# Patient Record
Sex: MALE | Race: WHITE | NOT HISPANIC OR LATINO | Employment: FULL TIME | RURAL
[De-identification: names, ages, dates, MRNs, and addresses within clinical notes are randomized per-mention and may not be internally consistent; named-entity substitution may affect disease eponyms.]

---

## 2020-08-26 ENCOUNTER — HISTORICAL (OUTPATIENT)
Dept: ADMINISTRATIVE | Facility: HOSPITAL | Age: 70
End: 2020-08-26

## 2020-08-27 LAB — SARS-COV+SARS-COV-2 AG RESP QL IA.RAPID: NEGATIVE

## 2022-08-30 ENCOUNTER — OUTSIDE PLACE OF SERVICE (OUTPATIENT)
Dept: CARDIOLOGY | Facility: HOSPITAL | Age: 72
End: 2022-08-30

## 2022-08-30 PROCEDURE — 93010 PR ELECTROCARDIOGRAM REPORT: ICD-10-PCS | Mod: ,,, | Performed by: INTERNAL MEDICINE

## 2022-08-30 PROCEDURE — 93010 ELECTROCARDIOGRAM REPORT: CPT | Mod: ,,, | Performed by: INTERNAL MEDICINE

## 2023-10-19 DIAGNOSIS — S86.111A: Primary | ICD-10-CM

## 2023-11-07 ENCOUNTER — OFFICE VISIT (OUTPATIENT)
Dept: ORTHOPEDICS | Facility: CLINIC | Age: 73
End: 2023-11-07
Payer: OTHER MISCELLANEOUS

## 2023-11-07 VITALS — BODY MASS INDEX: 29.52 KG/M2 | WEIGHT: 230 LBS | HEIGHT: 74 IN

## 2023-11-07 DIAGNOSIS — S86.111A STRAIN OF RIGHT SOLEUS MUSCLE, INITIAL ENCOUNTER: Primary | ICD-10-CM

## 2023-11-07 DIAGNOSIS — S86.111A: ICD-10-CM

## 2023-11-07 PROCEDURE — 99204 OFFICE O/P NEW MOD 45 MIN: CPT | Mod: S$PBB,,, | Performed by: ORTHOPAEDIC SURGERY

## 2023-11-07 PROCEDURE — 99213 OFFICE O/P EST LOW 20 MIN: CPT | Mod: PBBFAC | Performed by: ORTHOPAEDIC SURGERY

## 2023-11-07 PROCEDURE — 99204 PR OFFICE/OUTPT VISIT, NEW, LEVL IV, 45-59 MIN: ICD-10-PCS | Mod: S$PBB,,, | Performed by: ORTHOPAEDIC SURGERY

## 2023-11-07 RX ORDER — NAPROXEN 500 MG/1
500 TABLET ORAL 2 TIMES DAILY WITH MEALS
Qty: 60 TABLET | Refills: 3 | Status: SHIPPED | OUTPATIENT
Start: 2023-11-07

## 2023-11-07 RX ORDER — ATORVASTATIN CALCIUM 80 MG/1
80 TABLET, FILM COATED ORAL
COMMUNITY
Start: 2023-10-07

## 2023-11-07 RX ORDER — TAMSULOSIN HYDROCHLORIDE 0.4 MG/1
2 CAPSULE ORAL
COMMUNITY
Start: 2023-08-08

## 2023-11-07 RX ORDER — METOPROLOL SUCCINATE 100 MG/1
100 TABLET, EXTENDED RELEASE ORAL
COMMUNITY
Start: 2023-10-10

## 2023-11-07 RX ORDER — GLIPIZIDE 10 MG/1
10 TABLET ORAL
COMMUNITY
Start: 2023-10-07

## 2023-11-07 RX ORDER — ZOLPIDEM TARTRATE 10 MG/1
10 TABLET ORAL NIGHTLY PRN
COMMUNITY
Start: 2023-10-17

## 2023-11-07 RX ORDER — AMLODIPINE AND BENAZEPRIL HYDROCHLORIDE 10; 20 MG/1; MG/1
1 CAPSULE ORAL
COMMUNITY
Start: 2023-10-07
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
## 2023-12-07 ENCOUNTER — OFFICE VISIT (OUTPATIENT)
Dept: ORTHOPEDICS | Facility: CLINIC | Age: 73
End: 2023-12-07
Payer: OTHER MISCELLANEOUS

## 2023-12-07 DIAGNOSIS — S86.111A STRAIN OF RIGHT SOLEUS MUSCLE, INITIAL ENCOUNTER: Primary | ICD-10-CM

## 2023-12-07 PROCEDURE — 99214 PR OFFICE/OUTPT VISIT, EST, LEVL IV, 30-39 MIN: ICD-10-PCS | Mod: S$PBB,,, | Performed by: ORTHOPAEDIC SURGERY

## 2023-12-07 PROCEDURE — 99212 OFFICE O/P EST SF 10 MIN: CPT | Mod: PBBFAC | Performed by: ORTHOPAEDIC SURGERY

## 2023-12-07 PROCEDURE — 99214 OFFICE O/P EST MOD 30 MIN: CPT | Mod: S$PBB,,, | Performed by: ORTHOPAEDIC SURGERY

## 2023-12-07 NOTE — PROGRESS NOTES
73 y.o. Male returns to clinic for a follow up visit regarding     ICD-10-CM ICD-9-CM   1. Strain of right soleus muscle, initial encounter  S86.791A 844.8        Patient is doing better. He is going therapy twice a week, wearing his boot when out and about. He still have swelling noted.       Past Medical History:   Diagnosis Date    Diabetes mellitus, type 2     Hypertension      Past Surgical History:   Procedure Laterality Date    CARDIAC SURGERY           PHYSICAL EXAMINATION:    Ortho/SPM Exam  Still demonstrates 2+ pitting edema to the right lower extremity.  Dorsiflexion plantar flexion appears to be improving today when inspecting his tibiotalar joint.  Mild tenderness is present along the gastroc musculature proximally along the course of the soleus.    IMAGING:  No results found.     ASSESSMENT:      ICD-10-CM ICD-9-CM   1. 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  Still not at maximum medical improvement.  Recommend holding her out of work at this time.  Based on his work duties I do not think he is ready for even light duty at this point.  I will see him back in 4 weeks for recheck        There are no Patient Instructions on file for this visit.      No orders of the defined types were placed in this encounter.        Procedures

## 2024-01-11 ENCOUNTER — OFFICE VISIT (OUTPATIENT)
Dept: ORTHOPEDICS | Facility: CLINIC | Age: 74
End: 2024-01-11
Payer: OTHER MISCELLANEOUS

## 2024-01-11 DIAGNOSIS — S86.111D STRAIN OF RIGHT SOLEUS MUSCLE, SUBSEQUENT ENCOUNTER: Primary | ICD-10-CM

## 2024-01-11 PROCEDURE — 99213 OFFICE O/P EST LOW 20 MIN: CPT | Mod: PBBFAC | Performed by: ORTHOPAEDIC SURGERY

## 2024-01-11 PROCEDURE — 99214 OFFICE O/P EST MOD 30 MIN: CPT | Mod: S$PBB,,, | Performed by: ORTHOPAEDIC SURGERY

## 2024-01-11 NOTE — PROGRESS NOTES
73 y.o. Male returns to clinic for a follow up visit regarding     ICD-10-CM ICD-9-CM   1. Strain of right soleus muscle, subsequent encounter  S86.111D V58.89     844.8       Patient reports he is doing better.   He does still have some swelling and discoloration noted.   He has finished PT at this time.   He reports that even with normal driving his foot tires easily.     He has recently seen the cardiac doctor who told him to wear compression socks due to leaking veins.          Past Medical History:   Diagnosis Date    Diabetes mellitus, type 2     Hypertension      Past Surgical History:   Procedure Laterality Date    CARDIAC SURGERY           PHYSICAL EXAMINATION:    Ortho/SPM Exam    Pronounced swelling in the right leg with 2+ pitting edema present in the right lower extremity.  He has satisfactory dorsiflexion plantar flexion of the ankle of the tibiotalar joint.  Moderate tenderness is present within the gastroc.  IMAGING:  No results found.     ASSESSMENT:      ICD-10-CM ICD-9-CM   1. Strain of right soleus muscle, subsequent encounter  S86.111D V58.89     844.8       PLAN:     -Findings and treatment options were discussed with the patient  -All questions answered      Still having persistent swelling in his right lower extremity.  He is tells me that he did have an ultrasound performed the I do not have access to those results or imaging but he was told that he had and I a leaking vein.  This seems to indicate that he has some venous insufficiency her an injury may require vein ablation type procedure.  Due to the pronounced swelling which remains at this time point following his soleus injury I think that the muscle injury is probably a lesser concern at this point and the persistent swelling is the major concern at this point.  Still not yet cleared to return to work he has not yet at maximum medical improvement and this would be pending a resolution in his vascular status.  I will see him back in  1 month    There are no Patient Instructions on file for this visit.      No orders of the defined types were placed in this encounter.        Procedures

## 2024-01-18 ENCOUNTER — TELEPHONE (OUTPATIENT)
Dept: ORTHOPEDICS | Facility: CLINIC | Age: 74
End: 2024-01-18
Payer: MEDICARE

## 2024-01-18 NOTE — TELEPHONE ENCOUNTER
SPOKE TO ABDIEL. SHE WAS WANTING TO MAKE SURE WE GOT THE ULTRASOUND RESULTS AND TO SEE IF DR. HAWKINS HAS ANY FURTHER RECOMMENDATIONS. I TOLD HER IF THERE IS ANYTHING FURTHER AFTER DR. HAWKINS REVIEW THE RESULTS WE WILL GIVE HER A CALL.     ----- Message from Calli Thomas sent at 1/18/2024 12:38 PM CST -----  Regarding: RESULTS  ABDIEL WITH [employer] CALLING ABOUT RESULTS OF THE PATIENT VENOUS ULTRASOUND, CALL BACK NUMBER [phone]

## 2024-01-24 DIAGNOSIS — S86.111S: Primary | ICD-10-CM

## 2024-02-06 ENCOUNTER — OFFICE VISIT (OUTPATIENT)
Dept: VASCULAR SURGERY | Facility: CLINIC | Age: 74
End: 2024-02-06
Payer: MEDICARE

## 2024-02-06 ENCOUNTER — HOSPITAL ENCOUNTER (OUTPATIENT)
Dept: RADIOLOGY | Facility: HOSPITAL | Age: 74
Discharge: HOME | End: 2024-02-06
Attending: FAMILY MEDICINE
Payer: MEDICARE

## 2024-02-06 VITALS
SYSTOLIC BLOOD PRESSURE: 132 MMHG | HEIGHT: 72 IN | BODY MASS INDEX: 31.15 KG/M2 | WEIGHT: 230 LBS | HEART RATE: 76 BPM | DIASTOLIC BLOOD PRESSURE: 84 MMHG | RESPIRATION RATE: 16 BRPM

## 2024-02-06 DIAGNOSIS — M79.604 LEG PAIN, BILATERAL: ICD-10-CM

## 2024-02-06 DIAGNOSIS — I87.2 VENOUS INSUFFICIENCY: Primary | ICD-10-CM

## 2024-02-06 DIAGNOSIS — R60.0 LOWER EXTREMITY EDEMA: ICD-10-CM

## 2024-02-06 DIAGNOSIS — M79.661 PAIN AND SWELLING OF RIGHT LOWER LEG: ICD-10-CM

## 2024-02-06 DIAGNOSIS — M79.605 LEG PAIN, BILATERAL: ICD-10-CM

## 2024-02-06 DIAGNOSIS — M79.89 PAIN AND SWELLING OF RIGHT LOWER LEG: ICD-10-CM

## 2024-02-06 DIAGNOSIS — L81.9 HYPERPIGMENTATION OF SKIN: ICD-10-CM

## 2024-02-06 PROCEDURE — 1159F MED LIST DOCD IN RCRD: CPT | Mod: CPTII,,, | Performed by: FAMILY MEDICINE

## 2024-02-06 PROCEDURE — 4010F ACE/ARB THERAPY RXD/TAKEN: CPT | Mod: CPTII,,, | Performed by: FAMILY MEDICINE

## 2024-02-06 PROCEDURE — 3008F BODY MASS INDEX DOCD: CPT | Mod: CPTII,,, | Performed by: FAMILY MEDICINE

## 2024-02-06 PROCEDURE — 99215 OFFICE O/P EST HI 40 MIN: CPT | Mod: PBBFAC,25 | Performed by: FAMILY MEDICINE

## 2024-02-06 PROCEDURE — 3079F DIAST BP 80-89 MM HG: CPT | Mod: CPTII,,, | Performed by: FAMILY MEDICINE

## 2024-02-06 PROCEDURE — 93970 EXTREMITY STUDY: CPT | Mod: TC

## 2024-02-06 PROCEDURE — 3075F SYST BP GE 130 - 139MM HG: CPT | Mod: CPTII,,, | Performed by: FAMILY MEDICINE

## 2024-02-06 PROCEDURE — 1160F RVW MEDS BY RX/DR IN RCRD: CPT | Mod: CPTII,,, | Performed by: FAMILY MEDICINE

## 2024-02-06 PROCEDURE — 99204 OFFICE O/P NEW MOD 45 MIN: CPT | Mod: S$PBB,,, | Performed by: FAMILY MEDICINE

## 2024-02-06 PROCEDURE — 93970 EXTREMITY STUDY: CPT | Mod: 26,,, | Performed by: FAMILY MEDICINE

## 2024-02-06 RX ORDER — DAPAGLIFLOZIN 10 MG/1
10 TABLET, FILM COATED ORAL
COMMUNITY

## 2024-02-06 RX ORDER — ALBUTEROL SULFATE 90 UG/1
AEROSOL, METERED RESPIRATORY (INHALATION)
COMMUNITY

## 2024-02-06 RX ORDER — METHOCARBAMOL 500 MG/1
TABLET, FILM COATED ORAL
COMMUNITY
Start: 2023-09-25

## 2024-02-06 RX ORDER — NITROGLYCERIN 0.4 MG/1
TABLET SUBLINGUAL
COMMUNITY

## 2024-02-06 RX ORDER — VITAMIN E 268 MG
1 CAPSULE ORAL
COMMUNITY

## 2024-02-06 RX ORDER — METFORMIN HYDROCHLORIDE 1000 MG/1
TABLET ORAL
Status: ON HOLD | COMMUNITY
End: 2024-04-22 | Stop reason: HOSPADM

## 2024-02-06 RX ORDER — CLOTRIMAZOLE 1 %
CREAM (GRAM) TOPICAL
COMMUNITY
Start: 2024-01-23

## 2024-02-06 RX ORDER — SILDENAFIL 100 MG/1
TABLET, FILM COATED ORAL
COMMUNITY
Start: 2024-02-02
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
## 2024-02-08 ENCOUNTER — HOSPITAL ENCOUNTER (OUTPATIENT)
Dept: RADIOLOGY | Facility: HOSPITAL | Age: 74
Discharge: HOME | End: 2024-02-08
Attending: ORTHOPAEDIC SURGERY
Payer: MEDICARE

## 2024-02-08 ENCOUNTER — OFFICE VISIT (OUTPATIENT)
Dept: ORTHOPEDICS | Facility: CLINIC | Age: 74
End: 2024-02-08
Payer: OTHER MISCELLANEOUS

## 2024-02-08 DIAGNOSIS — M25.511 RIGHT SHOULDER PAIN, UNSPECIFIED CHRONICITY: ICD-10-CM

## 2024-02-08 DIAGNOSIS — M25.511 RIGHT SHOULDER PAIN, UNSPECIFIED CHRONICITY: Primary | ICD-10-CM

## 2024-02-08 DIAGNOSIS — S86.111S: ICD-10-CM

## 2024-02-08 PROCEDURE — 99214 OFFICE O/P EST MOD 30 MIN: CPT | Mod: S$PBB,,, | Performed by: ORTHOPAEDIC SURGERY

## 2024-02-08 PROCEDURE — 73030 X-RAY EXAM OF SHOULDER: CPT | Mod: TC,RT

## 2024-02-08 PROCEDURE — 73030 X-RAY EXAM OF SHOULDER: CPT | Mod: 26,RT,, | Performed by: ORTHOPAEDIC SURGERY

## 2024-02-08 PROCEDURE — 99212 OFFICE O/P EST SF 10 MIN: CPT | Mod: PBBFAC,25 | Performed by: ORTHOPAEDIC SURGERY
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
## 2024-02-22 ENCOUNTER — TELEPHONE (OUTPATIENT)
Dept: ORTHOPEDICS | Facility: CLINIC | Age: 74
End: 2024-02-22
Payer: MEDICARE

## 2024-02-27 ENCOUNTER — OFFICE VISIT (OUTPATIENT)
Dept: ORTHOPEDICS | Facility: CLINIC | Age: 74
End: 2024-02-27
Payer: OTHER MISCELLANEOUS

## 2024-02-27 DIAGNOSIS — S46.811A PARTIAL TEAR OF RIGHT SUBSCAPULARIS TENDON, INITIAL ENCOUNTER: ICD-10-CM

## 2024-02-27 DIAGNOSIS — M25.511 RIGHT SHOULDER PAIN, UNSPECIFIED CHRONICITY: ICD-10-CM

## 2024-02-27 DIAGNOSIS — M75.100 TEAR OF ROTATOR CUFF, UNSPECIFIED LATERALITY, UNSPECIFIED TEAR EXTENT, UNSPECIFIED WHETHER TRAUMATIC: Primary | ICD-10-CM

## 2024-02-27 PROCEDURE — 20610 DRAIN/INJ JOINT/BURSA W/O US: CPT | Mod: PBBFAC | Performed by: ORTHOPAEDIC SURGERY

## 2024-02-27 PROCEDURE — 99213 OFFICE O/P EST LOW 20 MIN: CPT | Mod: PBBFAC | Performed by: ORTHOPAEDIC SURGERY

## 2024-02-27 PROCEDURE — 99214 OFFICE O/P EST MOD 30 MIN: CPT | Mod: S$PBB,25,, | Performed by: ORTHOPAEDIC SURGERY

## 2024-02-27 PROCEDURE — 99999PBSHW PR PBB SHADOW TECHNICAL ONLY FILED TO HB: Mod: PBBFAC,,,

## 2024-02-27 RX ORDER — TRIAMCINOLONE ACETONIDE 40 MG/ML
80 INJECTION, SUSPENSION INTRA-ARTICULAR; INTRAMUSCULAR
Status: DISCONTINUED | OUTPATIENT
Start: 2024-02-27 | End: 2024-02-27 | Stop reason: HOSPADM

## 2024-02-27 RX ADMIN — TRIAMCINOLONE ACETONIDE 80 MG: 400 INJECTION, SUSPENSION INTRA-ARTICULAR; INTRAMUSCULAR at 10:02

## 2024-02-27 NOTE — LETTER
February 27, 2024      [facility] - Orthopedics  [facility] [address] [facility] [address]  Phone: [phone]  Fax: [phone]       Patient: Jose Gandhi   YOB: 1950  Date of Visit: 02/27/2024    To Whom It May Concern:    Bret Gandhi  was at [facility] on 02/27/2024. The patient will remian off work until follow-up visit in 4 weeks . If you have any questions or concerns, or if I can be of further assistance, please do not hesitate to contact me.    Sincerely,    Dr. Obinna Melissa

## 2024-02-27 NOTE — PROGRESS NOTES
Jose Gandhi returns to clinic for a follow up visit regarding     ICD-10-CM ICD-9-CM   1. Tear of rotator cuff, unspecified laterality, unspecified tear extent, unspecified whether traumatic  M75.100 840.4   2. Right shoulder pain, unspecified chronicity  M25.511 719.41   3. Partial tear of right subscapularis tendon, initial encounter  S46.811A 840.5       Patient is here to review the results of his MRI and discuss treatment options moving forward.       PAST MEDICAL HISTORY:   Past Medical History:   Diagnosis Date    Diabetes mellitus, type 2     Hypertension      PAST SURGICAL HISTORY:   Past Surgical History:   Procedure Laterality Date    CARDIAC SURGERY           PHYSICAL EXAMINATION:  General    Nursing note and vitals reviewed.  Constitutional: He is oriented to person, place, and time. He appears well-developed and well-nourished. No distress.   HENT:   Head: Normocephalic and atraumatic.   Eyes: Pupils are equal, round, and reactive to light.   Neck: Neck supple.   Cardiovascular:  Normal rate and intact distal pulses.            Pulmonary/Chest: Breath sounds normal. No respiratory distress. He exhibits no tenderness.   Abdominal: Soft. Bowel sounds are normal.   Neurological: He is alert and oriented to person, place, and time. He has normal reflexes. He displays normal reflexes. No cranial nerve deficit. He exhibits normal muscle tone.   Psychiatric: He has a normal mood and affect. His behavior is normal. Judgment and thought content normal.         Right Shoulder Exam     Inspection/Observation   Swelling: absent  Bruising: absent  Scapular Dyskinesia: positive    Tenderness   The patient is tender to palpation of the greater tuberosity, acromion and acromioclavicular joint.    Range of Motion   Active abduction:  abnormal   Passive abduction:  abnormal   Forward Flexion:  abnormal   Forward Elevation: abnormal  External Rotation 90 degrees: normal    Tests & Signs   Apprehension:  negative  Cross arm: positive  Drop arm: negative  Swanson test: positive  Impingement: positive  Rotator Cuff Painful Arc/Range: mild  Lag Sign 90 degrees: negative  Lift Off Sign: positive  Belly Press: positive  Active Compression Test (G[place] Ferris's Sign): positive  Jerk Test: negative    Other   Sensation: normal    Comments:  Pain with dynamic labral shear test.  There is pain and weakness with Whipple testing.     Left Shoulder Exam   Left shoulder exam is normal.       Muscle Strength   Right Upper Extremity   Shoulder External Rotation: 4/5   Supraspinatus: 4/5   Subscapularis: 4/5   Biceps: 4/5     Reflexes     Right Side   Biceps:  2+  Right Manrique's Sign:  absent    Vascular Exam     Right Pulses      Radial:                    2+      Capillary Refill  Right Hand: normal capillary refill            IMAGING:  MRI Shoulder Without Contrast Right    Result Date: 2/20/2024  EXAMINATION: MRI SHOULDER WITHOUT CONTRAST RIGHT CLINICAL HISTORY: right shoulder pain;  Pain in right shoulder TECHNIQUE: Multiplanar multislice images are were performed through the right shoulder. COMPARISON: Radiograph right shoulder 02/08/2024 FINDINGS: There is tendinosis of the supraspinatus tendon with low-grade articular surface tearing seen of the anterior more fibers and some interstitial tearing as well.  There is also mild tendinosis of the infraspinatus tendon without tear.  The teres minor tendon is intact.  There is a high-grade near full-thickness articular sided tear of the insertion of the subscapularis tendon with marked tendinosis. There is mild edema seen in the subscapularis musculature.  The overall muscle bulk appears normal.  Remainder of the muscle signal is normal. Long head of the biceps tendon appears normal in signal and location within the bicipital groove. Diffuse degeneration seen along the superior aspect of the labrum as well as posteriorly with arthritic change.  There is moderate osteoarthritic change  with marrow edema of the acromioclavicular joint with inferiorly directed osteophytes similar to that seen on the recent radiograph.  Trace fluid in the subacromial subdeltoid bursa.     1. Near full-thickness tear of the subscapularis tendon with tendinosis. 2. Low-grade articular surface tearing and interstitial tearing of the supraspinatus tendon at the footprint. 3. Mild edema of the subscapularis musculature. 4. Glenohumeral osteoarthritic change and degeneration of the labrum. 5. Moderate acromioclavicular joint osteoarthritis with marrow edema and inferiorly directed osteophytes. 6. Trace fluid in the subacromial subdeltoid bursa. Electronically signed by: Misael Johnson Date:    02/20/2024 Time:    19:50        ASSESSMENT:      ICD-10-CM ICD-9-CM   1. Tear of rotator cuff, unspecified laterality, unspecified tear extent, unspecified whether traumatic  M75.100 840.4   2. Right shoulder pain, unspecified chronicity  M25.511 719.41   3. Partial tear of right subscapularis tendon, initial encounter  S46.811A 840.5       PLAN:     -Findings and treatment options were discussed with the patient  -All questions answered  Natural history and expected course discussed. Questions answered.  Educational material distributed.  Reduction in offending activity.  Shoulder injection. See procedure note.  Physical therapy referral.    We discussed the potential for surgical intervention given his high-grade subscapularis tear but he has a smoker I have stressed the importance of smoking cessation so we are going to treat this conservatively with a steroid injection followed by physical therapy hold off of work at this time.  Not yet ready to return to work will see back in 4 weeks    There are no Patient Instructions on file for this visit.    Orders Placed This Encounter   Procedures    Large Joint Aspiration/Injection: R subacromial bursa       Procedures

## 2024-02-27 NOTE — PROCEDURES
Large Joint Aspiration/Injection: R subacromial bursa    Date/Time: 2/27/2024 10:00 AM    Performed by: Obinna Melissa MD  Authorized by: Obinna Melissa MD    Consent Done?:  Yes (Verbal)  Indications:  Pain  Site marked: the procedure site was marked    Local anesthetic:  Bupivacaine 0.25% without epinephrine (4 cc's of 0.25% bupivicaine)    Details:  Needle Size:  22 G  Approach:  Posterior  Location:  Shoulder  Site:  R subacromial bursa  Medications:  80 mg triamcinolone acetonide 40 mg/mL  Patient tolerance:  Patient tolerated the procedure well with no immediate complications

## 2024-03-26 ENCOUNTER — OFFICE VISIT (OUTPATIENT)
Dept: ORTHOPEDICS | Facility: CLINIC | Age: 74
End: 2024-03-26
Payer: OTHER MISCELLANEOUS

## 2024-03-26 DIAGNOSIS — M19.019 ACROMIOCLAVICULAR JOINT ARTHRITIS, UNSPECIFIED LATERALITY: ICD-10-CM

## 2024-03-26 DIAGNOSIS — Z01.818 PREPROCEDURAL EXAMINATION: Primary | ICD-10-CM

## 2024-03-26 DIAGNOSIS — M75.40 IMPINGEMENT SYNDROME OF SHOULDER REGION, UNSPECIFIED LATERALITY: ICD-10-CM

## 2024-03-26 DIAGNOSIS — M75.100 TEAR OF ROTATOR CUFF, UNSPECIFIED LATERALITY, UNSPECIFIED TEAR EXTENT, UNSPECIFIED WHETHER TRAUMATIC: Primary | ICD-10-CM

## 2024-03-26 DIAGNOSIS — M75.22 BICEPS TENDONITIS ON LEFT: ICD-10-CM

## 2024-03-26 PROCEDURE — 99214 OFFICE O/P EST MOD 30 MIN: CPT | Mod: S$PBB,,, | Performed by: ORTHOPAEDIC SURGERY

## 2024-03-26 PROCEDURE — 99212 OFFICE O/P EST SF 10 MIN: CPT | Mod: PBBFAC | Performed by: ORTHOPAEDIC SURGERY

## 2024-03-26 NOTE — H&P (VIEW-ONLY)
73 y.o. Male returns to clinic for a follow up visit regarding     ICD-10-CM ICD-9-CM   1. Tear of rotator cuff, unspecified laterality, unspecified tear extent, unspecified whether traumatic  M75.100 840.4   2. Impingement syndrome of shoulder region, unspecified laterality  M75.40 726.2   3. Acromioclavicular joint arthritis, unspecified laterality  M19.019 716.91   4. Biceps tendonitis on left  M75.22 726.12        States from his therapy visit yesterday,  he has some improvement but no much.  SANE is a 45.  Prior to the injection, it was a 40.  Still in tremendous pain. Has limited strength  Has quit smoking and is doing well there.      Past Medical History:   Diagnosis Date    Diabetes mellitus, type 2     Hypertension      Past Surgical History:   Procedure Laterality Date    CARDIAC SURGERY           PHYSICAL EXAMINATION:    General    Nursing note and vitals reviewed.  Constitutional: He is oriented to person, place, and time. He appears well-developed and well-nourished. No distress.   HENT:   Head: Normocephalic and atraumatic.   Eyes: Pupils are equal, round, and reactive to light.   Neck: Neck supple.   Cardiovascular:  Normal rate and intact distal pulses.            Pulmonary/Chest: Breath sounds normal. No respiratory distress. He exhibits no tenderness.   Abdominal: Soft. Bowel sounds are normal.   Neurological: He is alert and oriented to person, place, and time. He has normal reflexes. He displays normal reflexes. No cranial nerve deficit. He exhibits normal muscle tone.   Psychiatric: He has a normal mood and affect. His behavior is normal. Judgment and thought content normal.         Right Shoulder Exam     Inspection/Observation   Swelling: absent  Bruising: absent  Scapular Dyskinesia: positive    Tenderness   The patient is tender to palpation of the greater tuberosity, acromion and acromioclavicular joint.    Range of Motion   Active abduction:  abnormal   Passive abduction:  abnormal    Forward Flexion:  abnormal   Forward Elevation: abnormal  External Rotation 90 degrees: normal    Tests & Signs   Apprehension: negative  Cross arm: positive  Drop arm: negative  Swanson test: positive  Impingement: positive  Rotator Cuff Painful Arc/Range: mild  Lag Sign 90 degrees: negative  Lift Off Sign: positive  Belly Press: positive  Active Compression Test (Rooks's Sign): positive  Jerk Test: negative    Other   Sensation: normal    Comments:  Pain with dynamic labral shear test.  There is pain and weakness with Whipple testing.     Left Shoulder Exam   Left shoulder exam is normal.       Muscle Strength   Right Upper Extremity   Shoulder External Rotation: 4/5   Supraspinatus: 4/5   Subscapularis: 4/5   Biceps: 4/5     Reflexes     Right Side   Biceps:  2+  Right Manrique's Sign:  absent    Vascular Exam     Right Pulses      Radial:                    2+      Capillary Refill  Right Hand: normal capillary refill            IMAGING:  No results found.   Narrative & Impression  EXAMINATION:  MRI SHOULDER WITHOUT CONTRAST RIGHT     CLINICAL HISTORY:  right shoulder pain;  Pain in right shoulder     TECHNIQUE:  Multiplanar multislice images are were performed through the right shoulder.     COMPARISON:  Radiograph right shoulder 02/08/2024     FINDINGS:  There is tendinosis of the supraspinatus tendon with low-grade articular surface tearing seen of the anterior more fibers and some interstitial tearing as well.  There is also mild tendinosis of the infraspinatus tendon without tear.  The teres minor tendon is intact.  There is a high-grade near full-thickness articular sided tear of the insertion of the subscapularis tendon with marked tendinosis.     There is mild edema seen in the subscapularis musculature.  The overall muscle bulk appears normal.  Remainder of the muscle signal is normal.     Long head of the biceps tendon appears normal in signal and location within the bicipital groove.     Diffuse  degeneration seen along the superior aspect of the labrum as well as posteriorly with arthritic change.  There is moderate osteoarthritic change with marrow edema of the acromioclavicular joint with inferiorly directed osteophytes similar to that seen on the recent radiograph.  Trace fluid in the subacromial subdeltoid bursa.     Impression:     1. Near full-thickness tear of the subscapularis tendon with tendinosis.  2. Low-grade articular surface tearing and interstitial tearing of the supraspinatus tendon at the footprint.  3. Mild edema of the subscapularis musculature.  4. Glenohumeral osteoarthritic change and degeneration of the labrum.  5. Moderate acromioclavicular joint osteoarthritis with marrow edema and inferiorly directed osteophytes.  6. Trace fluid in the subacromial subdeltoid bursa.     ASSESSMENT:      ICD-10-CM ICD-9-CM   1. Tear of rotator cuff, unspecified laterality, unspecified tear extent, unspecified whether traumatic  M75.100 840.4   2. Impingement syndrome of shoulder region, unspecified laterality  M75.40 726.2   3. Acromioclavicular joint arthritis, unspecified laterality  M19.019 716.91   4. Biceps tendonitis on left  M75.22 726.12       PLAN:     -Findings and treatment options were discussed with the patient  -All questions answered      MRI findings were reviewed with the patient.  The patient has a symptomatic rt shoulder partial -thickness rotator cuff tear with associated impingement findings.  Muscle quality is well maintained.  Treatment options were reviewed to include both operative and nonoperative measures.  The patient has failed an initial course of conservative treatment.  Given the extent and duration of the patient's complaints, operative intervention is certainly reasonable at this point.  The details of arthroscopic rotator cuff repair with subacromial decompression, biceps tenotomy versus tenodesis, labral debridement, distal clavicle excision, were  discussed.    The patient understands the likely convalescence after surgery, in particular the expected postop rehab and recovery course. Alternatives both operative and non-operative with associated risks and benefits discussed. The outlined risks and potential complications of the proposed procedure include but are not limited to: infection, poor wound healing, scarring, stiffness, swelling, continued or recurrent pain, instability, hardware or prosthetic failure, symptomatic hardware requiring removal, weakness, neurovascular injury, numbness, chronic regional pain disorder, tissue nonhealing/irreparability/retear, contralateral ligament injury, chondral injury, arthritis, fracture, blood clot formation, inability to return to previous level of activity, anesthetic or regional block complication up to death, need for additional procedure as indicated, and potential need for further surgery.     he was also informed and understands the risks of surgery are greater for patients with a current condition or history of heart disease, obesity, clotting disorders, recurrent infections, steroid use, current or past smoking, and factors such as sedentary lifestyle and noncompliance with medications, therapy or follow-up. The degree of the increased risk is hard to estimate with any degree of precision.    All questions were answered. The patient has verbalized understanding of these issues and wishes to proceed as discussed. The patient understands that recovery time can be up to 6-12 months.      There are no Patient Instructions on file for this visit.      No orders of the defined types were placed in this encounter.        Procedures

## 2024-03-26 NOTE — PROGRESS NOTES
73 y.o. Male returns to clinic for a follow up visit regarding     ICD-10-CM ICD-9-CM   1. Tear of rotator cuff, unspecified laterality, unspecified tear extent, unspecified whether traumatic  M75.100 840.4   2. Impingement syndrome of shoulder region, unspecified laterality  M75.40 726.2   3. Acromioclavicular joint arthritis, unspecified laterality  M19.019 716.91   4. Biceps tendonitis on left  M75.22 726.12        States from his therapy visit yesterday,  he has some improvement but no much.  SANE is a 45.  Prior to the injection, it was a 40.  Still in tremendous pain. Has limited strength  Has quit smoking and is doing well there.      Past Medical History:   Diagnosis Date    Diabetes mellitus, type 2     Hypertension      Past Surgical History:   Procedure Laterality Date    CARDIAC SURGERY           PHYSICAL EXAMINATION:    General    Nursing note and vitals reviewed.  Constitutional: He is oriented to person, place, and time. He appears well-developed and well-nourished. No distress.   HENT:   Head: Normocephalic and atraumatic.   Eyes: Pupils are equal, round, and reactive to light.   Neck: Neck supple.   Cardiovascular:  Normal rate and intact distal pulses.            Pulmonary/Chest: Breath sounds normal. No respiratory distress. He exhibits no tenderness.   Abdominal: Soft. Bowel sounds are normal.   Neurological: He is alert and oriented to person, place, and time. He has normal reflexes. He displays normal reflexes. No cranial nerve deficit. He exhibits normal muscle tone.   Psychiatric: He has a normal mood and affect. His behavior is normal. Judgment and thought content normal.         Right Shoulder Exam     Inspection/Observation   Swelling: absent  Bruising: absent  Scapular Dyskinesia: positive    Tenderness   The patient is tender to palpation of the greater tuberosity, acromion and acromioclavicular joint.    Range of Motion   Active abduction:  abnormal   Passive abduction:  abnormal    Forward Flexion:  abnormal   Forward Elevation: abnormal  External Rotation 90 degrees: normal    Tests & Signs   Apprehension: negative  Cross arm: positive  Drop arm: negative  Swanson test: positive  Impingement: positive  Rotator Cuff Painful Arc/Range: mild  Lag Sign 90 degrees: negative  Lift Off Sign: positive  Belly Press: positive  Active Compression Test (Hidalgo's Sign): positive  Jerk Test: negative    Other   Sensation: normal    Comments:  Pain with dynamic labral shear test.  There is pain and weakness with Whipple testing.     Left Shoulder Exam   Left shoulder exam is normal.       Muscle Strength   Right Upper Extremity   Shoulder External Rotation: 4/5   Supraspinatus: 4/5   Subscapularis: 4/5   Biceps: 4/5     Reflexes     Right Side   Biceps:  2+  Right Manrique's Sign:  absent    Vascular Exam     Right Pulses      Radial:                    2+      Capillary Refill  Right Hand: normal capillary refill            IMAGING:  No results found.   Narrative & Impression  EXAMINATION:  MRI SHOULDER WITHOUT CONTRAST RIGHT     CLINICAL HISTORY:  right shoulder pain;  Pain in right shoulder     TECHNIQUE:  Multiplanar multislice images are were performed through the right shoulder.     COMPARISON:  Radiograph right shoulder 02/08/2024     FINDINGS:  There is tendinosis of the supraspinatus tendon with low-grade articular surface tearing seen of the anterior more fibers and some interstitial tearing as well.  There is also mild tendinosis of the infraspinatus tendon without tear.  The teres minor tendon is intact.  There is a high-grade near full-thickness articular sided tear of the insertion of the subscapularis tendon with marked tendinosis.     There is mild edema seen in the subscapularis musculature.  The overall muscle bulk appears normal.  Remainder of the muscle signal is normal.     Long head of the biceps tendon appears normal in signal and location within the bicipital groove.     Diffuse  degeneration seen along the superior aspect of the labrum as well as posteriorly with arthritic change.  There is moderate osteoarthritic change with marrow edema of the acromioclavicular joint with inferiorly directed osteophytes similar to that seen on the recent radiograph.  Trace fluid in the subacromial subdeltoid bursa.     Impression:     1. Near full-thickness tear of the subscapularis tendon with tendinosis.  2. Low-grade articular surface tearing and interstitial tearing of the supraspinatus tendon at the footprint.  3. Mild edema of the subscapularis musculature.  4. Glenohumeral osteoarthritic change and degeneration of the labrum.  5. Moderate acromioclavicular joint osteoarthritis with marrow edema and inferiorly directed osteophytes.  6. Trace fluid in the subacromial subdeltoid bursa.     ASSESSMENT:      ICD-10-CM ICD-9-CM   1. Tear of rotator cuff, unspecified laterality, unspecified tear extent, unspecified whether traumatic  M75.100 840.4   2. Impingement syndrome of shoulder region, unspecified laterality  M75.40 726.2   3. Acromioclavicular joint arthritis, unspecified laterality  M19.019 716.91   4. Biceps tendonitis on left  M75.22 726.12       PLAN:     -Findings and treatment options were discussed with the patient  -All questions answered      MRI findings were reviewed with the patient.  The patient has a symptomatic rt shoulder partial -thickness rotator cuff tear with associated impingement findings.  Muscle quality is well maintained.  Treatment options were reviewed to include both operative and nonoperative measures.  The patient has failed an initial course of conservative treatment.  Given the extent and duration of the patient's complaints, operative intervention is certainly reasonable at this point.  The details of arthroscopic rotator cuff repair with subacromial decompression, biceps tenotomy versus tenodesis, labral debridement, distal clavicle excision, were  discussed.    The patient understands the likely convalescence after surgery, in particular the expected postop rehab and recovery course. Alternatives both operative and non-operative with associated risks and benefits discussed. The outlined risks and potential complications of the proposed procedure include but are not limited to: infection, poor wound healing, scarring, stiffness, swelling, continued or recurrent pain, instability, hardware or prosthetic failure, symptomatic hardware requiring removal, weakness, neurovascular injury, numbness, chronic regional pain disorder, tissue nonhealing/irreparability/retear, contralateral ligament injury, chondral injury, arthritis, fracture, blood clot formation, inability to return to previous level of activity, anesthetic or regional block complication up to death, need for additional procedure as indicated, and potential need for further surgery.     he was also informed and understands the risks of surgery are greater for patients with a current condition or history of heart disease, obesity, clotting disorders, recurrent infections, steroid use, current or past smoking, and factors such as sedentary lifestyle and noncompliance with medications, therapy or follow-up. The degree of the increased risk is hard to estimate with any degree of precision.    All questions were answered. The patient has verbalized understanding of these issues and wishes to proceed as discussed. The patient understands that recovery time can be up to 6-12 months.      There are no Patient Instructions on file for this visit.      No orders of the defined types were placed in this encounter.        Procedures

## 2024-03-27 DIAGNOSIS — M75.100 TEAR OF ROTATOR CUFF, UNSPECIFIED LATERALITY, UNSPECIFIED TEAR EXTENT, UNSPECIFIED WHETHER TRAUMATIC: Primary | ICD-10-CM

## 2024-03-27 DIAGNOSIS — Z01.818 PREPROCEDURAL EXAMINATION: Primary | ICD-10-CM

## 2024-03-27 RX ORDER — CEFAZOLIN SODIUM 2 G/50ML
2 SOLUTION INTRAVENOUS
Status: CANCELLED | OUTPATIENT
Start: 2024-03-27

## 2024-03-27 RX ORDER — SODIUM CHLORIDE 9 MG/ML
INJECTION, SOLUTION INTRAVENOUS CONTINUOUS
Status: CANCELLED | OUTPATIENT
Start: 2024-03-27

## 2024-03-27 RX ORDER — MUPIROCIN 20 MG/G
OINTMENT TOPICAL
Status: CANCELLED | OUTPATIENT
Start: 2024-03-27

## 2024-04-15 ENCOUNTER — TELEPHONE (OUTPATIENT)
Dept: ORTHOPEDICS | Facility: CLINIC | Age: 74
End: 2024-04-15
Payer: MEDICARE

## 2024-04-15 NOTE — TELEPHONE ENCOUNTER
----- Message from Oanh Cheney sent at 4/15/2024  8:53 AM CDT -----  Regarding: QUESTION  PATIENT SAYS HE RECEIVED A TEXT MESSAGE SAYING HE HAS SOME SPECIAL TASKS HE HAS TO DO BEFORE SURGERY. CALL BACK NUMBER IS [phone].

## 2024-04-18 ENCOUNTER — TELEPHONE (OUTPATIENT)
Dept: ORTHOPEDICS | Facility: CLINIC | Age: 74
End: 2024-04-18
Payer: MEDICARE

## 2024-04-18 NOTE — TELEPHONE ENCOUNTER
----- Message from Calli Thomas sent at 4/18/2024 11:35 AM CDT -----  Regarding: RETURN CALL  MATT WITH [employer] CALLING STATING SHE IS MISSING THE CLAIM NUMBER AND THE PAYER, CALL BACK NUMBER [phone]

## 2024-04-19 ENCOUNTER — TELEPHONE (OUTPATIENT)
Dept: ORTHOPEDICS | Facility: CLINIC | Age: 74
End: 2024-04-19
Payer: MEDICARE

## 2024-04-19 RX ORDER — ASPIRIN 81 MG/1
81 TABLET ORAL DAILY
COMMUNITY

## 2024-04-19 NOTE — TELEPHONE ENCOUNTER
----- Message from Joellen Benedict sent at 4/19/2024 11:41 AM CDT -----    Returning a call to Cynthia. Call back # [phone]

## 2024-04-22 ENCOUNTER — ANESTHESIA EVENT (OUTPATIENT)
Dept: SURGERY | Facility: HOSPITAL | Age: 74
End: 2024-04-22
Payer: OTHER MISCELLANEOUS

## 2024-04-22 ENCOUNTER — ANESTHESIA (OUTPATIENT)
Dept: SURGERY | Facility: HOSPITAL | Age: 74
End: 2024-04-22
Payer: OTHER MISCELLANEOUS

## 2024-04-22 ENCOUNTER — HOSPITAL ENCOUNTER (OUTPATIENT)
Facility: HOSPITAL | Age: 74
Discharge: HOME | End: 2024-04-22
Attending: ORTHOPAEDIC SURGERY
Payer: OTHER MISCELLANEOUS

## 2024-04-22 VITALS
SYSTOLIC BLOOD PRESSURE: 190 MMHG | DIASTOLIC BLOOD PRESSURE: 94 MMHG | HEIGHT: 74 IN | HEART RATE: 72 BPM | OXYGEN SATURATION: 92 % | TEMPERATURE: 98 F | WEIGHT: 215 LBS | BODY MASS INDEX: 27.59 KG/M2 | RESPIRATION RATE: 18 BRPM

## 2024-04-22 DIAGNOSIS — M75.100 TEAR OF ROTATOR CUFF, UNSPECIFIED LATERALITY, UNSPECIFIED TEAR EXTENT, UNSPECIFIED WHETHER TRAUMATIC: Primary | ICD-10-CM

## 2024-04-22 PROBLEM — S43.431A SUPERIOR GLENOID LABRUM LESION OF RIGHT SHOULDER: Status: ACTIVE | Noted: 2024-04-22

## 2024-04-22 LAB
GLUCOSE SERPL-MCNC: 148 MG/DL (ref 70–105)
GLUCOSE SERPL-MCNC: 154 MG/DL (ref 70–105)

## 2024-04-22 PROCEDURE — 29827 SHO ARTHRS SRG RT8TR CUF RPR: CPT | Mod: RT,,, | Performed by: ORTHOPAEDIC SURGERY

## 2024-04-22 PROCEDURE — 63600175 PHARM REV CODE 636 W HCPCS: Performed by: NURSE ANESTHETIST, CERTIFIED REGISTERED

## 2024-04-22 PROCEDURE — 71000016 HC POSTOP RECOV ADDL HR: Performed by: ORTHOPAEDIC SURGERY

## 2024-04-22 PROCEDURE — 27201423 OPTIME MED/SURG SUP & DEVICES STERILE SUPPLY: Performed by: ORTHOPAEDIC SURGERY

## 2024-04-22 PROCEDURE — 64415 NJX AA&/STRD BRCH PLXS IMG: CPT | Performed by: ANESTHESIOLOGY

## 2024-04-22 PROCEDURE — 29828 SHO ARTHRS SRG BICP TENODSIS: CPT | Mod: 51,RT,, | Performed by: ORTHOPAEDIC SURGERY

## 2024-04-22 PROCEDURE — C1713 ANCHOR/SCREW BN/BN,TIS/BN: HCPCS | Performed by: ORTHOPAEDIC SURGERY

## 2024-04-22 PROCEDURE — 63600175 PHARM REV CODE 636 W HCPCS: Performed by: ORTHOPAEDIC SURGERY

## 2024-04-22 PROCEDURE — 27000689 HC BLADE LARYNGOSCOPE ANY SIZE: Performed by: NURSE ANESTHETIST, CERTIFIED REGISTERED

## 2024-04-22 PROCEDURE — 37000009 HC ANESTHESIA EA ADD 15 MINS: Performed by: ORTHOPAEDIC SURGERY

## 2024-04-22 PROCEDURE — 27000165 HC TUBE, ETT CUFFED: Performed by: NURSE ANESTHETIST, CERTIFIED REGISTERED

## 2024-04-22 PROCEDURE — 27000716 HC OXISENSOR PROBE, ANY SIZE: Performed by: NURSE ANESTHETIST, CERTIFIED REGISTERED

## 2024-04-22 PROCEDURE — C1889 IMPLANT/INSERT DEVICE, NOC: HCPCS | Performed by: ORTHOPAEDIC SURGERY

## 2024-04-22 PROCEDURE — D9220A PRA ANESTHESIA: Mod: ANES,,, | Performed by: ANESTHESIOLOGY

## 2024-04-22 PROCEDURE — 29824 SHO ARTHRS SRG DSTL CLAVICLC: CPT | Mod: 51,RT,, | Performed by: ORTHOPAEDIC SURGERY

## 2024-04-22 PROCEDURE — 63600175 PHARM REV CODE 636 W HCPCS: Mod: JZ,JG | Performed by: ANESTHESIOLOGY

## 2024-04-22 PROCEDURE — 97161 PT EVAL LOW COMPLEX 20 MIN: CPT

## 2024-04-22 PROCEDURE — 27000655: Performed by: NURSE ANESTHETIST, CERTIFIED REGISTERED

## 2024-04-22 PROCEDURE — 27000510 HC BLANKET BAIR HUGGER ANY SIZE: Performed by: NURSE ANESTHETIST, CERTIFIED REGISTERED

## 2024-04-22 PROCEDURE — 37000008 HC ANESTHESIA 1ST 15 MINUTES: Performed by: ORTHOPAEDIC SURGERY

## 2024-04-22 PROCEDURE — 29826 SHO ARTHRS SRG DECOMPRESSION: CPT | Mod: RT,,, | Performed by: ORTHOPAEDIC SURGERY

## 2024-04-22 PROCEDURE — 25000003 PHARM REV CODE 250: Performed by: ORTHOPAEDIC SURGERY

## 2024-04-22 PROCEDURE — 71000033 HC RECOVERY, INTIAL HOUR: Performed by: ORTHOPAEDIC SURGERY

## 2024-04-22 PROCEDURE — 71000015 HC POSTOP RECOV 1ST HR: Performed by: ORTHOPAEDIC SURGERY

## 2024-04-22 PROCEDURE — C9290 INJ, BUPIVACAINE LIPOSOME: HCPCS | Performed by: ANESTHESIOLOGY

## 2024-04-22 PROCEDURE — 36000710: Performed by: ORTHOPAEDIC SURGERY

## 2024-04-22 PROCEDURE — 82962 GLUCOSE BLOOD TEST: CPT

## 2024-04-22 PROCEDURE — 36000711: Performed by: ORTHOPAEDIC SURGERY

## 2024-04-22 PROCEDURE — D9220A PRA ANESTHESIA: Mod: CRNA,,, | Performed by: NURSE ANESTHETIST, CERTIFIED REGISTERED

## 2024-04-22 PROCEDURE — 25000003 PHARM REV CODE 250: Performed by: NURSE ANESTHETIST, CERTIFIED REGISTERED

## 2024-04-22 DEVICE — IMPLANTABLE DEVICE
Type: IMPLANTABLE DEVICE | Site: SHOULDER | Status: FUNCTIONAL
Brand: ROTATION MEDICAL TENDON STAPLES

## 2024-04-22 DEVICE — IMPLANTABLE DEVICE
Type: IMPLANTABLE DEVICE | Site: SHOULDER | Status: FUNCTIONAL
Brand: BIOINDUCTIVE IMPLANT WITH ARTHROSCOPIC DELIVERY SYSTEM - MEDIUM

## 2024-04-22 DEVICE — BIO-COMP SWVLK C, CLD 4.75X19.1MM
Type: IMPLANTABLE DEVICE | Site: SHOULDER | Status: FUNCTIONAL
Brand: ARTHREX®

## 2024-04-22 DEVICE — BONE ANCHORS 3 WITH ARTHROSCOPIC DELIVERY SYSTEM ADVANCED
Type: IMPLANTABLE DEVICE | Site: SHOULDER | Status: FUNCTIONAL
Brand: BONE ANCHORS WITH ARTHROSCOPIC DELIVERY SYSTEM - ADVANCED

## 2024-04-22 RX ORDER — OXYCODONE HYDROCHLORIDE 5 MG/1
10 TABLET ORAL EVERY 4 HOURS PRN
Status: DISCONTINUED | OUTPATIENT
Start: 2024-04-22 | End: 2024-04-22 | Stop reason: HOSPADM

## 2024-04-22 RX ORDER — ONDANSETRON 4 MG/1
4 TABLET, ORALLY DISINTEGRATING ORAL EVERY 8 HOURS PRN
Qty: 30 TABLET | Refills: 0 | Status: SHIPPED | OUTPATIENT
Start: 2024-04-22

## 2024-04-22 RX ORDER — ONDANSETRON 4 MG/1
8 TABLET, ORALLY DISINTEGRATING ORAL EVERY 8 HOURS PRN
Status: DISCONTINUED | OUTPATIENT
Start: 2024-04-22 | End: 2024-04-22 | Stop reason: HOSPADM

## 2024-04-22 RX ORDER — BUPIVACAINE HYDROCHLORIDE 5 MG/ML
INJECTION, SOLUTION EPIDURAL; INTRACAUDAL
Status: COMPLETED | OUTPATIENT
Start: 2024-04-22 | End: 2024-04-22

## 2024-04-22 RX ORDER — TRANEXAMIC ACID 100 MG/ML
INJECTION, SOLUTION INTRAVENOUS
Status: DISCONTINUED | OUTPATIENT
Start: 2024-04-22 | End: 2024-04-22

## 2024-04-22 RX ORDER — FENTANYL CITRATE 50 UG/ML
INJECTION, SOLUTION INTRAMUSCULAR; INTRAVENOUS
Status: DISCONTINUED | OUTPATIENT
Start: 2024-04-22 | End: 2024-04-22

## 2024-04-22 RX ORDER — HYDROMORPHONE HYDROCHLORIDE 2 MG/ML
0.5 INJECTION, SOLUTION INTRAMUSCULAR; INTRAVENOUS; SUBCUTANEOUS EVERY 5 MIN PRN
Status: DISCONTINUED | OUTPATIENT
Start: 2024-04-22 | End: 2024-04-22 | Stop reason: HOSPADM

## 2024-04-22 RX ORDER — LIDOCAINE HYDROCHLORIDE 20 MG/ML
INJECTION, SOLUTION EPIDURAL; INFILTRATION; INTRACAUDAL; PERINEURAL
Status: DISCONTINUED | OUTPATIENT
Start: 2024-04-22 | End: 2024-04-22

## 2024-04-22 RX ORDER — SODIUM CHLORIDE 9 MG/ML
INJECTION, SOLUTION INTRAVENOUS CONTINUOUS
Status: DISCONTINUED | OUTPATIENT
Start: 2024-04-22 | End: 2024-04-22 | Stop reason: HOSPADM

## 2024-04-22 RX ORDER — PROPOFOL 10 MG/ML
VIAL (ML) INTRAVENOUS
Status: DISCONTINUED | OUTPATIENT
Start: 2024-04-22 | End: 2024-04-22

## 2024-04-22 RX ORDER — IPRATROPIUM BROMIDE AND ALBUTEROL SULFATE 2.5; .5 MG/3ML; MG/3ML
3 SOLUTION RESPIRATORY (INHALATION) ONCE AS NEEDED
Status: DISCONTINUED | OUTPATIENT
Start: 2024-04-22 | End: 2024-04-22 | Stop reason: HOSPADM

## 2024-04-22 RX ORDER — DEXAMETHASONE SODIUM PHOSPHATE 4 MG/ML
INJECTION, SOLUTION INTRA-ARTICULAR; INTRALESIONAL; INTRAMUSCULAR; INTRAVENOUS; SOFT TISSUE
Status: DISCONTINUED | OUTPATIENT
Start: 2024-04-22 | End: 2024-04-22

## 2024-04-22 RX ORDER — DIPHENHYDRAMINE HYDROCHLORIDE 50 MG/ML
25 INJECTION INTRAMUSCULAR; INTRAVENOUS EVERY 6 HOURS PRN
Status: DISCONTINUED | OUTPATIENT
Start: 2024-04-22 | End: 2024-04-22 | Stop reason: HOSPADM

## 2024-04-22 RX ORDER — DOCUSATE SODIUM 100 MG/1
100 CAPSULE, LIQUID FILLED ORAL 2 TIMES DAILY
Status: DISCONTINUED | OUTPATIENT
Start: 2024-04-22 | End: 2024-04-22 | Stop reason: HOSPADM

## 2024-04-22 RX ORDER — ONDANSETRON HYDROCHLORIDE 2 MG/ML
4 INJECTION, SOLUTION INTRAVENOUS DAILY PRN
Status: DISCONTINUED | OUTPATIENT
Start: 2024-04-22 | End: 2024-04-22 | Stop reason: HOSPADM

## 2024-04-22 RX ORDER — MEPERIDINE HYDROCHLORIDE 25 MG/ML
25 INJECTION INTRAMUSCULAR; INTRAVENOUS; SUBCUTANEOUS ONCE AS NEEDED
Status: DISCONTINUED | OUTPATIENT
Start: 2024-04-22 | End: 2024-04-22 | Stop reason: HOSPADM

## 2024-04-22 RX ORDER — MUPIROCIN 20 MG/G
OINTMENT TOPICAL 2 TIMES DAILY
Status: DISCONTINUED | OUTPATIENT
Start: 2024-04-22 | End: 2024-04-22 | Stop reason: HOSPADM

## 2024-04-22 RX ORDER — ROCURONIUM BROMIDE 10 MG/ML
INJECTION, SOLUTION INTRAVENOUS
Status: DISCONTINUED | OUTPATIENT
Start: 2024-04-22 | End: 2024-04-22

## 2024-04-22 RX ORDER — MUPIROCIN 20 MG/G
OINTMENT TOPICAL
Status: DISCONTINUED | OUTPATIENT
Start: 2024-04-22 | End: 2024-04-22 | Stop reason: HOSPADM

## 2024-04-22 RX ORDER — OXYCODONE AND ACETAMINOPHEN 10; 325 MG/1; MG/1
1 TABLET ORAL EVERY 6 HOURS PRN
Qty: 30 TABLET | Refills: 0 | Status: SHIPPED | OUTPATIENT
Start: 2024-04-22

## 2024-04-22 RX ORDER — EPINEPHRINE 1 MG/ML
INJECTION INTRAMUSCULAR; INTRAVENOUS; SUBCUTANEOUS
Status: DISCONTINUED | OUTPATIENT
Start: 2024-04-22 | End: 2024-04-22 | Stop reason: HOSPADM

## 2024-04-22 RX ORDER — PROMETHAZINE HYDROCHLORIDE 25 MG/1
25 TABLET ORAL EVERY 6 HOURS PRN
Status: DISCONTINUED | OUTPATIENT
Start: 2024-04-22 | End: 2024-04-22 | Stop reason: HOSPADM

## 2024-04-22 RX ORDER — SODIUM CHLORIDE 0.9 % (FLUSH) 0.9 %
2 SYRINGE (ML) INJECTION
Status: DISCONTINUED | OUTPATIENT
Start: 2024-04-22 | End: 2024-04-22 | Stop reason: HOSPADM

## 2024-04-22 RX ORDER — ONDANSETRON HYDROCHLORIDE 2 MG/ML
INJECTION, SOLUTION INTRAVENOUS
Status: DISCONTINUED | OUTPATIENT
Start: 2024-04-22 | End: 2024-04-22

## 2024-04-22 RX ORDER — ACETAMINOPHEN 10 MG/ML
1000 INJECTION, SOLUTION INTRAVENOUS ONCE
Status: DISCONTINUED | OUTPATIENT
Start: 2024-04-22 | End: 2024-04-22 | Stop reason: HOSPADM

## 2024-04-22 RX ORDER — OXYCODONE HYDROCHLORIDE 5 MG/1
5 TABLET ORAL EVERY 4 HOURS PRN
Status: DISCONTINUED | OUTPATIENT
Start: 2024-04-22 | End: 2024-04-22 | Stop reason: HOSPADM

## 2024-04-22 RX ORDER — MORPHINE SULFATE 10 MG/ML
4 INJECTION INTRAMUSCULAR; INTRAVENOUS; SUBCUTANEOUS EVERY 5 MIN PRN
Status: DISCONTINUED | OUTPATIENT
Start: 2024-04-22 | End: 2024-04-22 | Stop reason: HOSPADM

## 2024-04-22 RX ADMIN — DEXAMETHASONE SODIUM PHOSPHATE 8 MG: 4 INJECTION, SOLUTION INTRA-ARTICULAR; INTRALESIONAL; INTRAMUSCULAR; INTRAVENOUS; SOFT TISSUE at 10:04

## 2024-04-22 RX ADMIN — CEFAZOLIN 2 G: 2 INJECTION, POWDER, FOR SOLUTION INTRAMUSCULAR; INTRAVENOUS at 10:04

## 2024-04-22 RX ADMIN — BUPIVACAINE 10 ML: 13.3 INJECTION, SUSPENSION, LIPOSOMAL INFILTRATION at 10:04

## 2024-04-22 RX ADMIN — ROCURONIUM BROMIDE 25 MG: 10 INJECTION, SOLUTION INTRAVENOUS at 11:04

## 2024-04-22 RX ADMIN — TRANEXAMIC ACID 1000 MG: 100 INJECTION, SOLUTION INTRAVENOUS at 12:04

## 2024-04-22 RX ADMIN — ROCURONIUM BROMIDE 50 MG: 10 INJECTION, SOLUTION INTRAVENOUS at 10:04

## 2024-04-22 RX ADMIN — BUPIVACAINE HYDROCHLORIDE 10 ML: 5 INJECTION, SOLUTION EPIDURAL; INTRACAUDAL; PERINEURAL at 10:04

## 2024-04-22 RX ADMIN — SODIUM CHLORIDE: 9 INJECTION, SOLUTION INTRAVENOUS at 07:04

## 2024-04-22 RX ADMIN — LIDOCAINE HYDROCHLORIDE 50 MG: 20 INJECTION, SOLUTION INTRAVENOUS at 10:04

## 2024-04-22 RX ADMIN — TRANEXAMIC ACID 1000 MG: 100 INJECTION, SOLUTION INTRAVENOUS at 10:04

## 2024-04-22 RX ADMIN — FENTANYL CITRATE 100 MCG: 50 INJECTION INTRAMUSCULAR; INTRAVENOUS at 10:04

## 2024-04-22 RX ADMIN — PROPOFOL 150 MG: 10 INJECTION, EMULSION INTRAVENOUS at 10:04

## 2024-04-22 RX ADMIN — ONDANSETRON 4 MG: 2 INJECTION INTRAMUSCULAR; INTRAVENOUS at 10:04

## 2024-04-22 RX ADMIN — SODIUM CHLORIDE: 9 INJECTION, SOLUTION INTRAVENOUS at 10:04

## 2024-04-22 RX ADMIN — SUGAMMADEX 200 MG: 100 INJECTION, SOLUTION INTRAVENOUS at 12:04

## 2024-04-22 NOTE — DISCHARGE INSTRUCTIONS
Complete the exercises for your right shoulder three times per day. Do not fully extend your elbow due to biceps tendon repair.  Use your ice frequently but especially after exercising  Take pain medicine as needed prior to exercises to improve comfort/tolerance.    Ball Squeeze        Slowly a soft rubber ball while breathing normally. Repeat with other hand.  Repeat frequently through the day.    Scapular Retraction         Pinch shoulder blades together and down toward your belt line. Relax. Surgical arm in your brace.  Repeat 30 times per set. Do 3 sessions per day.     ELBOW: Bicep Curl        Begin with elbow sightly bent and palm facing forward. Bend elbow.  Repeat 30 times      Pendulum         Bend forward 90° at waist, leaning on table for support. Rock body in a circular pattern to move arm clockwise 30 times then counterclockwise 30 times.   Rock your body side to side to move arm back and forth 30 times.   Adjust your feet to place one foot in front of the other and rock forward and back and allow your arm to swing forward and backward 30 times. Keep elbow slightly bent  Your arm movement will be small and should be PASSIVE. Do not use your shoulder muscles to move your arm. After finishing, bring your hand to your stomach and protect your arm while standing up. Bend backward several times to keep your back from getting sore.    Copyright © I. All rights reserved.

## 2024-04-22 NOTE — ANESTHESIA PREPROCEDURE EVALUATION
"                                                                                                             04/22/2024  Jose Gandhi is a 73 y.o., male.      Pre-op Assessment    I have reviewed the Patient Summary Reports.     I have reviewed the Nursing Notes. I have reviewed the NPO Status.   I have reviewed the Medications.     Review of Systems  Anesthesia Hx:  No problems with previous Anesthesia             Denies Family Hx of Anesthesia complications.    Denies Personal Hx of Anesthesia complications.                    Social:  Non-Smoker, No Alcohol Use       Cardiovascular:  Exercise tolerance: good   Hypertension  Past MI CAD   CABG/stent       PVD hyperlipidemia   ECG has been reviewed. Last EF 45%, long term h/o ischemic cardiomyopathy    Followed by [facility], patient underwent cardiac clearance by Dr. Tran and considered "acceptable risk for planned procedure".    Notes from [facility] reviewed including labs and most recent cardiac studies      RCRI class 2                           Pulmonary:        Sleep Apnea, CPAP                Musculoskeletal:     Tear of rotator cuff, unspecified laterality, unspecified tear extent, unspecified whether traumatic [M75.100]            Endocrine:  Diabetes               Physical Exam  General: Well nourished, Cooperative and Alert    Airway:  Mallampati: II   Mouth Opening: Normal  TM Distance: Normal  Tongue: Normal  Neck ROM: Normal ROM    Chest/Lungs:  Clear to auscultation, Normal Respiratory Rate    Heart:  Rate: Normal  Rhythm: Regular Rhythm        Chemistry        Component Value Date/Time     03/26/2024 1507    K 3.7 03/26/2024 1507     03/26/2024 1507    CO2 26 03/26/2024 1507    BUN 19 (H) 03/26/2024 1507    CREATININE 0.84 03/26/2024 1507     (H) 03/26/2024 1507        Component Value Date/Time    CALCIUM 9.5 03/26/2024 1507              Anesthesia Plan  Type of Anesthesia, risks & benefits discussed:    Anesthesia Type: Gen ETT, " Regional  Intra-op Monitoring Plan: Standard ASA Monitors  Post Op Pain Control Plan: multimodal analgesia  Induction:  IV  Airway Plan: Direct, Post-Induction  Informed Consent: Informed consent signed with the Patient and all parties understand the risks and agree with anesthesia plan.  All questions answered.   ASA Score: 3  Day of Surgery Review of History & Physical: H&P Update referred to the surgeon/provider.I have interviewed and examined the patient. I have reviewed the patient's H&P dated: There are no significant changes.   Anesthesia Plan Notes: ASA 3, GETA with ISB (right)    Ready For Surgery From Anesthesia Perspective.     .
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
## 2024-04-22 NOTE — ANESTHESIA POSTPROCEDURE EVALUATION
Anesthesia Post Evaluation    Patient: Jose Gandhi    Procedure(s) Performed: Procedure(s) (LRB):  REPAIR, ROTATOR CUFF, ARTHROSCOPIC (Right)  ARTHROSCOPY, SHOULDER, WITH SUBACROMIAL SPACE DECOMPRESSION (Right)  ARTHROSCOPY,SHOULDER,WITH BICEPS TENODESIS (Right)  ARTHROSCOPY, SHOULDER, WITH DISTAL CLAVICLE EXCISION (Right)  DEBRIDEMENT, SHOULDER, ARTHROSCOPIC (Right)    Final Anesthesia Type: general      Patient location during evaluation: PACU  Patient participation: Yes- Able to Participate  Level of consciousness: awake and alert and oriented  Post-procedure vital signs: reviewed and stable  Pain management: adequate  Airway patency: patent  SONAL mitigation strategies: Multimodal analgesia and Use of major conduction anesthesia (spinal/epidural) or peripheral nerve block  PONV status at discharge: No PONV  Anesthetic complications: no      Cardiovascular status: hemodynamically stable  Respiratory status: unassisted and spontaneous ventilation  Hydration status: euvolemic  Follow-up not needed.              Vitals Value Taken Time   /77 04/22/24 1231   Temp 36.4 °C (97.5 °F) 04/22/24 1231   Pulse 68 04/22/24 1232   Resp 22 04/22/24 1232   SpO2 90 % 04/22/24 1232   Vitals shown include unfiled device data.      No case tracking events are documented in the log.      Pain/Blaze Score: No data recorded

## 2024-04-22 NOTE — OP NOTE
Surgery Date: 4/22/2024      Surgeon(s) and Role:     * Obinna Melissa MD - Primary    Assistant: Martínez Perez    Pre-op Diagnosis:   Tear of rotator cuff, unspecified laterality, unspecified tear extent, unspecified whether traumatic [M75.100]     Post-op Diagnosis: 1.   Right shoulder partial articular sided tear supraspinatus tendon  Right shoulder adhesive capsulitis  Right shoulder SLAP tear  Right shoulder subacromial impingement  Right shoulder distal clavicle osteoarthritis  Procedure:  Procedure(s) (LRB):  REPAIR, ROTATOR CUFF, ARTHROSCOPIC (Right)  ARTHROSCOPY, SHOULDER, WITH SUBACROMIAL SPACE DECOMPRESSION (Right)  ARTHROSCOPY,SHOULDER,WITH BICEPS TENODESIS (Right)  ARTHROSCOPY, SHOULDER, WITH DISTAL CLAVICLE EXCISION (Right)   Right shoulder arthroscopic extensive debridement of glenohumeral joint    Anesthesia:  General + interscalene block    EBL:  5 mL     Implants:   Implant Name Type Inv. Item Serial No.  Lot No. LRB No. Used Action   ANCHOR BIOCOMP SWVLLOK - [id number]  ANCHOR BIOCOMP SWVLLOK  [employer] [id number] Right 1 Implanted   ANCHOR TENDON 8 - [id number]  ANCHOR TENDON 8  [employer] [id number] Right 1 Implanted   ANCHOR BONE ARTHSCP DEL SYS - [id number]  ANCHOR BONE ARTHSCP DEL SYS  [employer] [id number] Right 1 Implanted   IMPLANT ARTHSCP BIOINDUCTV MED - [id number]  IMPLANT ARTHSCP BIOINDUCTV MED  [employer] [id number] Right 1 Implanted       Description of findings:  see below    Complication:   none        Procedure: The patient was taken to the operating room and was initially placed supine.  Anesthesia was administered, as was an interscalene block, and pre-operative antibiotics were given.  A timeout was performed. The patient was then placed in the lateral decubitus position on a bean bag.  All bony prominences were well padded.  An axillary roll was created and appropriately placed.  The arm was then prepped and draped in the usual sterile fashion. The arm was placed  into a spider arm verma.  A standard posterior portal was then undertaken and a diagnostic arthroscopy was performed.   With the glenohumeral joint we noted significant synovitis along the anterior aspect of the shoulder and inferior capsule.  The biceps demonstrated a type 2 slap tear with significant fraying of the anterior and superior labrum and a definitive tear of the superior labrum was demonstrated.  Extensive synovitis was extending onto the biceps as well.  I tenotomized the biceps and performed a debridement of the very thickened rotator interval released the coracohumeral ligament superior glenohumeral ligament and portions of the middle glenohumeral ligament.  I was able to expose the subscapularis circumferentially and inspected this and found no evidence of tearing.  There was partial articular sided supraspinatus tearing and this was debrided with a shaver and the anterior superior inferior posterior labrum was likewise debrided.  This completed the extensive debridement of the glenohumeral joint.    The arthroscope was then inserted into the subacromial space.  A lateral portal was established 2-3 fingerbreadths lateral to the anterolateral edge of the acromion.  The rotator cuff was then visualized.  Partial bursal sided tearing was seen at the leading edge of the supraspinatus.  I elected to repair the rotator cuff utilizing Regeneten augment.  The collagen vascular patch was introduced through a lateral portal.  This was laid on top of the supraspinatus and infraspinatus tendons to cover the partial articular sided tear which were present.  Combination of PLLA and PEEK bone staples were utilized to fixate the Regeneten patch in satisfactory position.  This completed the partial rotator cuff repair.    Attention was then turned towards the biceps tendon.  The transverse humeral ligament was identified and released.  The biceps was noted to have synovitis surrounding the biceps tendon.  The biceps  was then exteriorized after the bicipital groove had been thoroughly debrided and decorticated.  2 cm of biceps tendon was excised.  A 2. FiberWire suture was used to create a Krackow suture configuration in the biceps tendon.  The biceps tendon and the corresponding suture strand were coupled to a 4.75 mm BioComposite SwiveLock anchor.   hole was created high within the bicipital groove in SwiveLock anchor was introduced and seated appropriately to complete the biceps tenodesis in an onlay suprapectoral fashion        The undersurface of the acromion was inspected.  The acromion was classified as Type 3 .  A posterior cutting block technique was utilized to perform a subacromial decompression.  The posterior border of the distal clavicle was used a reference point for a complete decompression, as the acromion was converted from a curved type II-III acromion to a flat, type I acromion.     The distal clavicle was then inspected.  The distal clavicle demonstrated marked arthrosis and narrowing.  A distal clavicle excision was then performed, resecting 8-10 mm of distal clavicle with an arthroscopic lucio. The superior and posterior acromioclavicular ligaments were protected. A smooth resection and thorough decompression was noted at final inspection.      This completed the procedure.  Final pictures were taken, and all instruments were removed. Portals were closed with 3-0 monocryl.  Steri-strips were applied, sterile dressings were placed, and the patient was placed into a shoulder abduction pillow sling.           Disposition:awakened from anesthesia, extubated and taken to the recovery room in a stable condition, having suffered no apparent untoward event.

## 2024-04-22 NOTE — ANESTHESIA PROCEDURE NOTES
Peripheral Block    Patient location during procedure: pre-op   Block not for primary anesthetic.  Reason for block: at surgeon's request and post-op pain management   Post-op Pain Location: right shoulder   Start time: 4/22/2024 10:07 AM  Timeout: 4/22/2024 10:07 AM   End time: 4/22/2024 10:12 AM    Staffing  Authorizing Provider: Brown Cook DO  Performing Provider: Brown Cook DO    Staffing  Performed by: Brown Cook DO  Authorized by: Brown Cook DO    Preanesthetic Checklist  Completed: patient identified, IV checked, site marked, risks and benefits discussed, surgical consent, monitors and equipment checked, pre-op evaluation and timeout performed  Peripheral Block  Patient position: sitting  Prep: ChloraPrep  Patient monitoring: heart rate, cardiac monitor, continuous pulse ox, continuous capnometry and frequent blood pressure checks  Block type: interscalene  Laterality: right  Injection technique: single shot  Needle  Needle type: Stimuplex   Needle gauge: 22 G  Needle length: 2 in  Needle localization: anatomical landmarks and ultrasound guidance   -ultrasound image captured on disc.  Assessment  Injection assessment: negative aspiration, negative parasthesia and local visualized surrounding nerve  Paresthesia pain: none  Heart rate change: no  Slow fractionated injection: yes    Medications:    Medications: BUPivacaine (pf) (MARCAINE) injection 0.5% - Perineural   10 mL - 4/22/2024 10:12:00 AM    Additional Notes  VSS.  [facility] RN monitoring vitals throughout procedure.  Patient tolerated procedure well.

## 2024-04-22 NOTE — ANESTHESIA PROCEDURE NOTES
Intubation    Date/Time: 4/22/2024 10:18 AM    Performed by: J Carlos Lawler CRNA  Authorized by: Brown Cook DO    Intubation:     Induction:  Intravenous    Intubated:  Postinduction    Mask Ventilation:  Easy mask    Attempts:  1    Attempted By:  CRNA    Method of Intubation:  Direct    Blade:  Violet 4    Laryngeal View Grade: Grade I - full view of cords      Difficult Airway Encountered?: No      Complications:  None    Airway Device:  Oral endotracheal tube    Airway Device Size:  7.5    Style/Cuff Inflation:  Cuffed (inflated to minimal occlusive pressure)    Tube secured:  22    Secured at:  The lips    Placement Verified By:  Capnometry and Revisualization with laryngoscopy    Complicating Factors:  None    Findings Post-Intubation:  BS equal bilateral and atraumatic/condition of teeth unchanged

## 2024-04-22 NOTE — PLAN OF CARE
DR. NIKOS HAWKINS               Cuff Debridement/Regeneten Patch + Biceps Tenodesis  Post-Operative Protocol    Sling for comfort, discontinue as tolerated. (days, to one week)  All ROM for weeks 0-6 performed w/ elbow flexed  May advance rehabilitation as rapidly as motion and pain allow.       Phase I - (PASSIVE):  Pendulums to warm-up      Passive Range of Motion     Week 1:  Elbow/wrist/hand AROM  Begin active scapular retraction/protraction exercises with therapist cueing  PROM and AAROM as tolerated   Supine External Rotation - Full   Supine Forward Elevation - Full (with elbow flexed)  Supine Internal Rotation - Full       Phase II - (ACTIVE):  Pendulums to warm-up      Active Range of Motion with terminal stretch to prescribed limits    Week 2-3:  Supine to Start ? Seated External Rotation   Supine to Start ? Seated Forward Elevation (with elbow flexed)   Internal Rotation       Phase III - (RESISTED): Pendulums to warm up and continue with phase 2    Weeks 4-6:  External and Internal Rotation   Standing forward punch  Seated rows  Shoulder Shrugs  Bear Hugs  ** NO RESISTED ELBOW FLEXION/SUPINATION UNTIL 6 WEEKS      WEIGHT TRAINING:    Week 6:  Light Bicep curls/resisted supination gradually progressed to premorbid levels  Keep hands within eyesight, Keep elbows bent  Minimize overhead activities   No  press, pull-downs behind head, or wide  bench)  Gradual progression of resistance controlling velocity, duration, intensity, and frequency       Return to Activities:  Computer  Early-days  Golf   4 weeks (chip and putt only)  Tennis   8 weeks  Contact Sports  4 months

## 2024-04-22 NOTE — TRANSFER OF CARE
"Anesthesia Transfer of Care Note    Patient: Jose Gandhi    Procedure(s) Performed: Procedure(s) (LRB):  REPAIR, ROTATOR CUFF, ARTHROSCOPIC (Right)  ARTHROSCOPY, SHOULDER, WITH SUBACROMIAL SPACE DECOMPRESSION (Right)  ARTHROSCOPY,SHOULDER,WITH BICEPS TENODESIS (Right)  ARTHROSCOPY, SHOULDER, WITH DISTAL CLAVICLE EXCISION (Right)  DEBRIDEMENT, SHOULDER, ARTHROSCOPIC (Right)    Patient location: PACU    Anesthesia Type: general and regional    Transport from OR: Transported from OR on 6-10 L/min O2 by face mask with adequate spontaneous ventilation    Post pain: adequate analgesia    Post assessment: no apparent anesthetic complications    Post vital signs: stable    Level of consciousness: responds to stimulation, awake and sedated    Nausea/Vomiting: no nausea/vomiting    Complications: none    Transfer of care protocol was followed    Last vitals: Visit Vitals  /77 (BP Location: Left arm, Patient Position: Lying)   Pulse 63   Temp 36.4 °C (97.5 °F)   Resp (!) 22   Ht 6' 2" (1.88 m)   Wt 97.5 kg (215 lb)   SpO2 (!) 92%   BMI 27.60 kg/m²     "

## 2024-04-22 NOTE — PLAN OF CARE
Problem: Physical Therapy  Goal: Physical Therapy Goal  Description: Short term goals     Patient and family will demonstrate/verbalize competence with illustrated HEP; shoulder abduction brace management, cryotherapy management, dressing technique maintaining PROM of the affected shoulder    Long term goals  Patient will regain full independent functional mobility once outpatient PT completed.   Outcome: Met     Patient and spouse demonstrated competence with post-op care for anticipated d/c home today. Patient will follow up with outpatient PT when cleared by Dr Melissa.

## 2024-04-29 NOTE — DISCHARGE SUMMARY
[facility]  Discharge Note  Short Stay    Procedure(s) (LRB):  REPAIR, ROTATOR CUFF, ARTHROSCOPIC (Right)  ARTHROSCOPY, SHOULDER, WITH SUBACROMIAL SPACE DECOMPRESSION (Right)  ARTHROSCOPY,SHOULDER,WITH BICEPS TENODESIS (Right)  ARTHROSCOPY, SHOULDER, WITH DISTAL CLAVICLE EXCISION (Right)  DEBRIDEMENT, SHOULDER, ARTHROSCOPIC (Right)      OUTCOME: Patient tolerated treatment/procedure well without complication and is now ready for discharge.    DISPOSITION: Home or Self Care    FINAL DIAGNOSIS:  Superior glenoid labrum lesion of right shoulder    FOLLOWUP: In clinic    DISCHARGE INSTRUCTIONS:    Discharge Procedure Orders   SLING ORTHOPEDIC MEDIUM FOR HOME USE   Order Comments: Abduction pillow sling for small, medium, large, massive rotator cuff repairs; Abduction pillow sling for proximal humerus ORIF, reverse shoulder arthroplasty, total shoulder arthroplasty.  Regular sling for clavicle ORIF, shoulder debridements.     Ambulatory referral/consult to Physical/Occupational Therapy   Standing Status: Future   Referral Priority: Routine Referral Type: Physical Medicine   Referral Reason: Specialty Services Required   Number of Visits Requested: 1     Diet general     Remove dressing in 72 hours     Call MD for:  temperature >100.4     Call MD for:  persistent nausea and vomiting     Call MD for:  severe uncontrolled pain     Call MD for:  difficulty breathing, headache or visual disturbances     Call MD for:  redness, tenderness, or signs of infection (pain, swelling, redness, odor or green/yellow discharge around incision site)     Call MD for:  hives     Call MD for:  persistent dizziness or light-headedness     Call MD for:  extreme fatigue     Non weight bearing        TIME SPENT ON DISCHARGE: 9 minutes

## 2024-05-01 ENCOUNTER — OFFICE VISIT (OUTPATIENT)
Dept: ORTHOPEDICS | Facility: CLINIC | Age: 74
End: 2024-05-01
Payer: OTHER MISCELLANEOUS

## 2024-05-01 DIAGNOSIS — Z98.890 S/P ARTHROSCOPY OF RIGHT SHOULDER: Primary | ICD-10-CM

## 2024-05-01 PROCEDURE — 99214 OFFICE O/P EST MOD 30 MIN: CPT | Mod: PBBFAC | Performed by: NURSE PRACTITIONER

## 2024-05-01 PROCEDURE — 99024 POSTOP FOLLOW-UP VISIT: CPT | Mod: ,,, | Performed by: NURSE PRACTITIONER

## 2024-05-01 NOTE — LETTER
May 1, 2024      [facility]  [address] [place] [address] [place] [address]  Phone: [phone]  Fax: [phone]       Patient: Jose Gandhi   YOB: 1950  Date of Visit: 05/01/2024    To Whom It May Concern:    Bret Gandhi  was at [facility] on 05/01/2024. The patient will remain off work for 4 weeks until follow-up appointment with Dr. Melissa. If you have any questions or concerns, or if I can be of further assistance, please do not hesitate to contact me.    Sincerely,    CHANEL Cruz

## 2024-05-01 NOTE — PROGRESS NOTES
Repair, Rotator Cuff, Arthroscopic - Right, Arthroscopy, Shoulder, With Subacromial Space Decompression - Right, Arthroscopy,shoulder,with Biceps Tenodesis - Right, Arthroscopy, Shoulder, With Distal Clavicle Excision - Right, and Debridement, Shoulder, Arthroscopic - Right  4/22/2024       Pt is here today for First post-operative followup of his shoulder arthroscopy.      he is doing well. He wished to do his physical therapy at [facility] in [place] since he has been treated there previously.  He is worker's comp.  He is accompanied by his adjusted today.  His incisions look good.  Steri-Strips intact.  He is wearing his arm sling.  We discuss his plan of care in detail.  Not taking much pain medication.    We have reviewed his findings and discussed plan of care and future treatment options, including the physical therapy plan.                                                                                     PHYSICAL EXAMINATION:     Incision sites healed well. Sling is in appropriate position  No evidence of any erythema, infection or induration  Range of motion of the shoulder:   Biceps tone appears appropriate      IMAGING: no new imaging    No image results found.                                                                                   ASSESSMENT:                                                                                                                                               1. Status post shoulder arthroscopy, doing well.                                                                                                                               PLAN:                                                                                                                                                     1. Continue with PT  2. Emphasized scapular stabilization to improve overall function.  3. I have discussed return to activity in detail.  4. he will see us back in 4 weeks.                                       5. All questions were answered and he should contact us if he  has any questions or concerns in the interim.            We will set up outpatient physical therapy.  Return to clinic with Dr. Melissa in 4 weeks.  Okay to work out of sling.  We will hold him off work at this time  There are no Patient Instructions on file for this visit.

## 2024-05-07 ENCOUNTER — OFFICE VISIT (OUTPATIENT)
Dept: VASCULAR SURGERY | Facility: CLINIC | Age: 74
End: 2024-05-07
Payer: MEDICARE

## 2024-05-07 VITALS
HEIGHT: 74 IN | SYSTOLIC BLOOD PRESSURE: 144 MMHG | DIASTOLIC BLOOD PRESSURE: 84 MMHG | BODY MASS INDEX: 27.8 KG/M2 | WEIGHT: 216.63 LBS | RESPIRATION RATE: 16 BRPM | HEART RATE: 74 BPM

## 2024-05-07 DIAGNOSIS — I87.2 VENOUS INSUFFICIENCY: ICD-10-CM

## 2024-05-07 DIAGNOSIS — R60.0 LOWER EXTREMITY EDEMA: ICD-10-CM

## 2024-05-07 DIAGNOSIS — L81.9 HYPERPIGMENTATION OF SKIN: Primary | ICD-10-CM

## 2024-05-07 DIAGNOSIS — M79.605 LEG PAIN, BILATERAL: ICD-10-CM

## 2024-05-07 DIAGNOSIS — M79.604 LEG PAIN, BILATERAL: ICD-10-CM

## 2024-05-07 PROCEDURE — 99213 OFFICE O/P EST LOW 20 MIN: CPT | Mod: PBBFAC | Performed by: FAMILY MEDICINE

## 2024-05-07 PROCEDURE — 1160F RVW MEDS BY RX/DR IN RCRD: CPT | Mod: CPTII,,, | Performed by: FAMILY MEDICINE

## 2024-05-07 PROCEDURE — 3079F DIAST BP 80-89 MM HG: CPT | Mod: CPTII,,, | Performed by: FAMILY MEDICINE

## 2024-05-07 PROCEDURE — 99213 OFFICE O/P EST LOW 20 MIN: CPT | Mod: S$PBB,,, | Performed by: FAMILY MEDICINE

## 2024-05-07 PROCEDURE — 4010F ACE/ARB THERAPY RXD/TAKEN: CPT | Mod: CPTII,,, | Performed by: FAMILY MEDICINE

## 2024-05-07 PROCEDURE — 1159F MED LIST DOCD IN RCRD: CPT | Mod: CPTII,,, | Performed by: FAMILY MEDICINE

## 2024-05-07 PROCEDURE — 3077F SYST BP >= 140 MM HG: CPT | Mod: CPTII,,, | Performed by: FAMILY MEDICINE
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
## 2024-06-04 ENCOUNTER — OFFICE VISIT (OUTPATIENT)
Dept: ORTHOPEDICS | Facility: CLINIC | Age: 74
End: 2024-06-04
Payer: MEDICARE

## 2024-06-04 DIAGNOSIS — Z98.890 S/P ARTHROSCOPY OF RIGHT SHOULDER: Primary | ICD-10-CM

## 2024-06-04 PROCEDURE — 4010F ACE/ARB THERAPY RXD/TAKEN: CPT | Mod: CPTII,,, | Performed by: ORTHOPAEDIC SURGERY

## 2024-06-04 PROCEDURE — 99213 OFFICE O/P EST LOW 20 MIN: CPT | Mod: PBBFAC | Performed by: ORTHOPAEDIC SURGERY

## 2024-06-04 PROCEDURE — 99024 POSTOP FOLLOW-UP VISIT: CPT | Mod: ,,, | Performed by: ORTHOPAEDIC SURGERY

## 2024-06-04 PROCEDURE — 1159F MED LIST DOCD IN RCRD: CPT | Mod: CPTII,,, | Performed by: ORTHOPAEDIC SURGERY

## 2024-06-04 PROCEDURE — 1160F RVW MEDS BY RX/DR IN RCRD: CPT | Mod: CPTII,,, | Performed by: ORTHOPAEDIC SURGERY

## 2024-06-04 NOTE — LETTER
June 4, 2024      [facility]  [address] [facility] [address]  Phone: [phone]  Fax: [phone]       Patient: Jose Gandhi   YOB: 1950  Date of Visit: 06/04/2024    To Whom It May Concern:    Bret Gandhi  was at [facility] on 06/04/2024. The patient may remain off work until re-evaluated in 6 weeks. If you have any questions or concerns, or if I can be of further assistance, please do not hesitate to contact me.    Sincerely,    MD Alta Cook RN

## 2024-06-04 NOTE — PROGRESS NOTES
Repair, Rotator Cuff, Arthroscopic - Right, Arthroscopy, Shoulder, With Subacromial Space Decompression - Right, Arthroscopy,shoulder,with Biceps Tenodesis - Right, Arthroscopy, Shoulder, With Distal Clavicle Excision - Right, and Debridement, Shoulder, Arthroscopic - Right 4/22/2024      Pt is here today for Second post-operative followup of his shoulder arthroscopy.  he is doing well and is continuing therapy at [facility] in [place].  We have reviewed his findings and discussed plan of care and future treatment options, including the physical therapy plan.                                                                                   PHYSICAL EXAMINATION:     Incision sites healed well. Sling is in appropriate position  No evidence of any erythema, infection or induration  Range of motion of the shoulder: near full ROM in FF, ER  Biceps tone appears appropriate      IMAGING: no new imaging    No image results found.                                                                                   ASSESSMENT:                                                                                                                                               1. Status post shoulder arthroscopy, doing well.                                                                                                                               PLAN:                                                                                                                                                     1. Continue with PT- cont passive stretch pain free. May be suitable for desk duty but not anything more aggressive   2. Emphasized scapular stabilization to improve overall function.  3. I have discussed return to activity in detail.  4. he will see us back in 6 weeks.                                      5. All questions were answered and he should contact us if he  has any questions or concerns in the interim.              There are  no Patient Instructions on file for this visit.

## 2024-07-23 ENCOUNTER — OFFICE VISIT (OUTPATIENT)
Dept: ORTHOPEDICS | Facility: CLINIC | Age: 74
End: 2024-07-23
Payer: OTHER MISCELLANEOUS

## 2024-07-23 DIAGNOSIS — Z98.890 S/P ARTHROSCOPY OF RIGHT SHOULDER: Primary | ICD-10-CM

## 2024-07-23 PROCEDURE — 99214 OFFICE O/P EST MOD 30 MIN: CPT | Mod: PBBFAC | Performed by: ORTHOPAEDIC SURGERY

## 2024-07-23 PROCEDURE — 99999 PR PBB SHADOW E&M-EST. PATIENT-LVL IV: CPT | Mod: PBBFAC,,, | Performed by: ORTHOPAEDIC SURGERY

## 2024-07-23 PROCEDURE — 99212 OFFICE O/P EST SF 10 MIN: CPT | Mod: S$PBB,,, | Performed by: ORTHOPAEDIC SURGERY

## 2024-07-23 NOTE — PROGRESS NOTES
Repair, Rotator Cuff, Arthroscopic - Right, Arthroscopy, Shoulder, With Subacromial Space Decompression - Right, Arthroscopy,shoulder,with Biceps Tenodesis - Right, Arthroscopy, Shoulder, With Distal Clavicle Excision - Right, and Debridement, Shoulder, Arthroscopic - Right  4/22/2024       Pt is here today for Third post-operative followup of his shoulder arthroscopy.      he is doing well.    He reports he is still limited in some of his ROM.     We have reviewed his findings and discussed plan of care and future treatment options, including the physical therapy plan.                                                                                     PHYSICAL EXAMINATION:     Incision sites healed well. Sling is in appropriate position  No evidence of any erythema, infection or induration  Range of motion of the shoulder: 0-105 FF, 0-60 ER. Passive is full   Biceps tone appears appropriate      IMAGING: no new imaging    No image results found.                                                                                   ASSESSMENT:                                                                                                                                               1. Status post shoulder arthroscopy, doing well.                                                                                                                               PLAN:                                                                                                                                                     1. Continue with PT- will add 4 more weeks.  Not yet ready to return to work.   2. Emphasized scapular stabilization to improve overall function.  3. I have discussed return to activity in detail.  4. he will see us back in 4 weeks.                                      5. All questions were answered and he should contact us if he  has any questions or concerns in the interim.              There are no Patient  Instructions on file for this visit.

## 2024-07-23 NOTE — LETTER
July 23, 2024      [facility]  [address] [facility] [address]  Phone: [phone]  Fax: [phone]       Patient: Jose Gandhi   YOB: 1950  Date of Visit: 07/23/2024    To Whom It May Concern:    Bret Gandhi  was at [facility] on 07/23/2024. The patient may remain off work pending results on return appointment in 4 weeks.. If you have any questions or concerns, or if I can be of further assistance, please do not hesitate to contact me.    Sincerely,  Dr Obinna Melissa

## 2024-08-27 ENCOUNTER — OFFICE VISIT (OUTPATIENT)
Dept: ORTHOPEDICS | Facility: CLINIC | Age: 74
End: 2024-08-27
Payer: OTHER MISCELLANEOUS

## 2024-08-27 DIAGNOSIS — Z98.890 S/P ARTHROSCOPY OF RIGHT SHOULDER: Primary | ICD-10-CM

## 2024-08-27 PROCEDURE — 99212 OFFICE O/P EST SF 10 MIN: CPT | Mod: PBBFAC | Performed by: ORTHOPAEDIC SURGERY

## 2024-08-27 PROCEDURE — 99999 PR PBB SHADOW E&M-EST. PATIENT-LVL II: CPT | Mod: PBBFAC,,, | Performed by: ORTHOPAEDIC SURGERY

## 2024-08-27 PROCEDURE — 99214 OFFICE O/P EST MOD 30 MIN: CPT | Mod: S$PBB,,, | Performed by: ORTHOPAEDIC SURGERY

## 2024-08-27 NOTE — PROGRESS NOTES
Jose Gandhi returns to clinic for a follow up visit regarding     ICD-10-CM ICD-9-CM   1. S/P arthroscopy of right shoulder  Z98.890 V45.89       Patient is 4 months post surgery he states he is doing well sane score is an 80.      PAST MEDICAL HISTORY:   Past Medical History:   Diagnosis Date    Diabetes mellitus, type 2     Hypertension      PAST SURGICAL HISTORY:   Past Surgical History:   Procedure Laterality Date    ARTHROSCOPIC DEBRIDEMENT OF SHOULDER Right 4/22/2024    Procedure: DEBRIDEMENT, SHOULDER, ARTHROSCOPIC;  Surgeon: Obinna Melissa MD;  Location: [facility] OR;  Service: Orthopedics;  Laterality: Right;  Right shoulder arthroscopic extensive debridement of glenohumeral joint    ARTHROSCOPIC REPAIR OF ROTATOR CUFF OF SHOULDER Right 4/22/2024    Procedure: REPAIR, ROTATOR CUFF, ARTHROSCOPIC;  Surgeon: Obinna Melissa MD;  Location: [facility] OR;  Service: Orthopedics;  Laterality: Right;    ARTHROSCOPY OF SHOULDER WITH DECOMPRESSION OF SUBACROMIAL SPACE Right 4/22/2024    Procedure: ARTHROSCOPY, SHOULDER, WITH SUBACROMIAL SPACE DECOMPRESSION;  Surgeon: Obinna Melissa MD;  Location: [facility] OR;  Service: Orthopedics;  Laterality: Right;    ARTHROSCOPY OF SHOULDER WITH REMOVAL OF DISTAL CLAVICLE Right 4/22/2024    Procedure: ARTHROSCOPY, SHOULDER, WITH DISTAL CLAVICLE EXCISION;  Surgeon: Obinna Melissa MD;  Location: [facility] OR;  Service: Orthopedics;  Laterality: Right;    ARTHROSCOPY,SHOULDER,WITH BICEPS TENODESIS Right 4/22/2024    Procedure: ARTHROSCOPY,SHOULDER,WITH BICEPS TENODESIS;  Surgeon: Obinna Melissa MD;  Location: [facility] OR;  Service: Orthopedics;  Laterality: Right;    CARDIAC SURGERY           PHYSICAL EXAMINATION:  Ortho/SPM Exam  Cuff strength appears to be satisfactory full active and passive forward elevation external rotation internal rotation is limited to the level of the low lumbar spine.  Belly press and bear hug test demonstrate no  discernible deficits in strength    IMAGING:  No results found.       ASSESSMENT:      ICD-10-CM ICD-9-CM   1. S/P arthroscopy of right shoulder  Z98.890 V45.89       PLAN:     -Findings and treatment options were discussed with the patient  -All questions answered  Natural history and expected course discussed. Questions answered.  Educational material distributed.  Reduction in offending activity.  Gentle ROM exercises.    He is okay to resume driving at this point.  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  I think this sounds excessive at this point sounds like very strenuous work as cuff is simply not ready for that.  I will see him back in 2 months for recheck he is not yet at maximum medical improvement.  10 lb max weight at this time no overhead duties recommended okay to drive.  There are no Patient Instructions on file for this visit.  He is doing well.  Sane score is about an 80.  Hold off full duty at this point okay to drive.  Orders Placed This Encounter   Procedures    Ambulatory referral/consult to Physical/Occupational Therapy       Procedures

## 2024-08-27 NOTE — LETTER
August 27, 2024      [facility]  [address]  [place] [address]  Phone: [phone]  Fax: [phone]       Patient: Jose Gandhi   YOB: 1950  Date of Visit: 08/27/2024    To Whom It May Concern:    Bret Gandhi  was at [facility] on 08/27/2024. The patient may return to work on 08/28/2024 with restrictions. He is okay to resume driving at this point. As far as restrictions were concerned he has not able to do any lifting greater than 10 lbs.  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 I think this sounds excessive at this point sounds like very strenuous work as cuff is simply not ready for that. I will see him back in 2 months for recheck he is not yet at maximum medical improvement. 10 lb max weight at this time no overhead duties recommended okay to drive.  If you have any questions or concerns, or if I can be of further assistance, please do not hesitate to contact me.    Sincerely,          Obinna Melissa MD

## 2024-10-29 ENCOUNTER — OFFICE VISIT (OUTPATIENT)
Dept: ORTHOPEDICS | Facility: CLINIC | Age: 74
End: 2024-10-29
Payer: OTHER MISCELLANEOUS

## 2024-10-29 DIAGNOSIS — Z98.890 S/P ARTHROSCOPY OF RIGHT SHOULDER: Primary | ICD-10-CM

## 2024-10-29 PROCEDURE — 99213 OFFICE O/P EST LOW 20 MIN: CPT | Mod: PBBFAC | Performed by: ORTHOPAEDIC SURGERY

## 2024-10-29 PROCEDURE — 99999 PR PBB SHADOW E&M-EST. PATIENT-LVL III: CPT | Mod: PBBFAC,,, | Performed by: ORTHOPAEDIC SURGERY

## 2024-11-05 ENCOUNTER — TELEPHONE (OUTPATIENT)
Dept: ORTHOPEDICS | Facility: CLINIC | Age: 74
End: 2024-11-05
Payer: MEDICARE

## 2024-11-05 NOTE — TELEPHONE ENCOUNTER
----- Message from Bettina sent at 11/5/2024  8:38 AM CST -----  Regarding: Copy of Work Release  Who Called: Jose Gandhi    Caller is requesting assistance/information from provider's office.    Symptoms (please be specific): Patient said he would like a copy of his work release from his previous appointment      Preferred Method of Contact: Phone Call  Patient's Preferred Phone Number on File: [phone]

## 2024-11-06 ENCOUNTER — TELEPHONE (OUTPATIENT)
Dept: ORTHOPEDICS | Facility: CLINIC | Age: 74
End: 2024-11-06
Payer: MEDICARE

## 2024-11-06 NOTE — TELEPHONE ENCOUNTER
----- Message from Tameka sent at 11/6/2024  9:31 AM CST -----  Who Called: Jose Gandhi    Patient is returning phone call    Who Left Message for Patient:Cynthia   Does the patient know what this is regarding?:work excuse       Preferred Method of Contact: Phone Call  Patient's Preferred Phone Number on File: [phone]   Best Call Back Number, if different:  Additional Information:

## (undated) DEVICE — GLOVE BIOGEL ECLIPSE SZ 6.5

## (undated) DEVICE — CANNULA THREADED 8.5 X 72MM

## (undated) DEVICE — CANNULA CLEAR TRAC 8X76X5MM

## (undated) DEVICE — DRAPE SURGICAL STERILE HD SHOULDER W/POUCH 59 X 99IN

## (undated) DEVICE — GOWN POLY REINF BRTH SLV XL

## (undated) DEVICE — STRIP MEDI WND CLSR 1/2X4IN

## (undated) DEVICE — SUT MONOCRYL 3-0 PS-2 UND

## (undated) DEVICE — TUBING CROSSFLOW INFLOW CASS

## (undated) DEVICE — GLOVE BIOGEL SKINSENSE PI 7.0

## (undated) DEVICE — POUCH INSTRUMENT 2 POCKET

## (undated) DEVICE — Device

## (undated) DEVICE — DRAPE INVISISHIELD U 48X52IN

## (undated) DEVICE — DRESSING XEROFORM NONADH 1X8IN

## (undated) DEVICE — BURR CUT BN HELIX 4.5MM

## (undated) DEVICE — STOCKINETTE IMPERVIOUS LG 9X48

## (undated) DEVICE — KIT SHLDR POMIERSKIWATSON RUSH

## (undated) DEVICE — SOL NACL IRR 3000ML

## (undated) DEVICE — GOWN X-LARGE

## (undated) DEVICE — DRAPE INCISE IOBAN 2 13X13IN

## (undated) DEVICE — TUBE SUCTION MEDI-VAC STERILE

## (undated) DEVICE — GLOVE 7.0 PROTEXIS PI BLUE

## (undated) DEVICE — SPONGE COTTON TRAY 4X4IN

## (undated) DEVICE — BANDAGE COHES LTX TAN 4INX5YD

## (undated) DEVICE — GLOVE SENSICARE PI GRN 6

## (undated) DEVICE — SHAVER SYNNOVATOR PLAT SERIES 4.5MM

## (undated) DEVICE — WAND COBLATION TURBOVAC 90

## (undated) DEVICE — MARKER SKIN RULER AND LABEL

## (undated) DEVICE — DEVICE SUCTION H20 BROOM 12FT

## (undated) DEVICE — ADHESIVE MASTISOL VIAL 48/BX

## (undated) DEVICE — APPLICATOR CHLORAPREP ORN 26ML

## (undated) DEVICE — NDL QUINCKE SPINAL 18G 3.5IN

## (undated) DEVICE — BLADE INCISOR + STR VIOL 4.5MM

## (undated) DEVICE — GLOVE SENSICARE PI ALOE 6.5

## (undated) DEVICE — GLOVE SENSICARE PI GRN 8